# Patient Record
Sex: MALE | Race: WHITE | NOT HISPANIC OR LATINO
[De-identification: names, ages, dates, MRNs, and addresses within clinical notes are randomized per-mention and may not be internally consistent; named-entity substitution may affect disease eponyms.]

---

## 2017-10-24 PROBLEM — Z00.00 ENCOUNTER FOR PREVENTIVE HEALTH EXAMINATION: Status: ACTIVE | Noted: 2017-10-24

## 2017-10-27 ENCOUNTER — APPOINTMENT (OUTPATIENT)
Dept: UROLOGY | Facility: CLINIC | Age: 62
End: 2017-10-27
Payer: MEDICAID

## 2017-10-27 VITALS
HEIGHT: 73 IN | SYSTOLIC BLOOD PRESSURE: 114 MMHG | DIASTOLIC BLOOD PRESSURE: 76 MMHG | WEIGHT: 198 LBS | TEMPERATURE: 98.5 F | HEART RATE: 64 BPM | BODY MASS INDEX: 26.24 KG/M2

## 2017-10-27 DIAGNOSIS — R97.20 ELEVATED PROSTATE, SPECIFIC ANTIGEN [PSA]: ICD-10-CM

## 2017-10-27 DIAGNOSIS — Z78.9 OTHER SPECIFIED HEALTH STATUS: ICD-10-CM

## 2017-10-27 DIAGNOSIS — I10 ESSENTIAL (PRIMARY) HYPERTENSION: ICD-10-CM

## 2017-10-27 DIAGNOSIS — Z87.891 PERSONAL HISTORY OF NICOTINE DEPENDENCE: ICD-10-CM

## 2017-10-27 PROCEDURE — 99204 OFFICE O/P NEW MOD 45 MIN: CPT

## 2017-10-27 RX ORDER — AZITHROMYCIN 250 MG/1
250 TABLET, FILM COATED ORAL
Qty: 6 | Refills: 0 | Status: COMPLETED | COMMUNITY
Start: 2017-07-07

## 2017-10-27 RX ORDER — LISINOPRIL 2.5 MG/1
2.5 TABLET ORAL
Qty: 30 | Refills: 0 | Status: ACTIVE | COMMUNITY
Start: 2017-05-13

## 2017-10-30 LAB
APPEARANCE: CLEAR
BACTERIA UR CULT: NORMAL
BACTERIA: NEGATIVE
BILIRUBIN URINE: NEGATIVE
BLOOD URINE: NEGATIVE
COLOR: ABNORMAL
GLUCOSE QUALITATIVE U: NEGATIVE MG/DL
HYALINE CASTS: 0 /LPF
KETONES URINE: ABNORMAL
LEUKOCYTE ESTERASE URINE: NEGATIVE
MICROSCOPIC-UA: NORMAL
NITRITE URINE: NEGATIVE
PH URINE: 5
PROTEIN URINE: NEGATIVE MG/DL
RED BLOOD CELLS URINE: 2 /HPF
SPECIFIC GRAVITY URINE: 1.02
SQUAMOUS EPITHELIAL CELLS: 0 /HPF
UROBILINOGEN URINE: NEGATIVE MG/DL
WHITE BLOOD CELLS URINE: 0 /HPF

## 2017-11-06 ENCOUNTER — FORM ENCOUNTER (OUTPATIENT)
Age: 62
End: 2017-11-06

## 2017-11-07 ENCOUNTER — OUTPATIENT (OUTPATIENT)
Dept: OUTPATIENT SERVICES | Facility: HOSPITAL | Age: 62
LOS: 1 days | End: 2017-11-07
Payer: MEDICAID

## 2017-11-07 ENCOUNTER — APPOINTMENT (OUTPATIENT)
Dept: CT IMAGING | Facility: CLINIC | Age: 62
End: 2017-11-07
Payer: MEDICAID

## 2017-11-07 DIAGNOSIS — R31.29 OTHER MICROSCOPIC HEMATURIA: ICD-10-CM

## 2017-11-07 DIAGNOSIS — Z00.8 ENCOUNTER FOR OTHER GENERAL EXAMINATION: ICD-10-CM

## 2017-11-07 PROCEDURE — 82565 ASSAY OF CREATININE: CPT

## 2017-11-07 PROCEDURE — 74178 CT ABD&PLV WO CNTR FLWD CNTR: CPT | Mod: 26

## 2017-11-07 PROCEDURE — 74178 CT ABD&PLV WO CNTR FLWD CNTR: CPT

## 2017-11-20 ENCOUNTER — APPOINTMENT (OUTPATIENT)
Dept: UROLOGY | Facility: CLINIC | Age: 62
End: 2017-11-20
Payer: MEDICAID

## 2017-11-20 VITALS — TEMPERATURE: 97.4 F | SYSTOLIC BLOOD PRESSURE: 122 MMHG | DIASTOLIC BLOOD PRESSURE: 80 MMHG | HEART RATE: 73 BPM

## 2017-11-20 DIAGNOSIS — R31.29 OTHER MICROSCOPIC HEMATURIA: ICD-10-CM

## 2017-11-20 PROCEDURE — 52000 CYSTOURETHROSCOPY: CPT

## 2017-11-20 RX ORDER — ASCORBIC ACID 1000 MG
TABLET ORAL
Refills: 0 | Status: ACTIVE | COMMUNITY

## 2017-11-20 RX ORDER — MULTIVITAMIN
TABLET ORAL
Refills: 0 | Status: ACTIVE | COMMUNITY

## 2017-11-20 RX ORDER — PNV NO.95/FERROUS FUM/FOLIC AC 28MG-0.8MG
1000 TABLET ORAL
Refills: 0 | Status: ACTIVE | COMMUNITY

## 2017-11-20 RX ORDER — SENNOSIDES 8.6 MG
TABLET ORAL
Refills: 0 | Status: ACTIVE | COMMUNITY

## 2017-11-20 RX ORDER — MULTIVIT-MIN/FOLIC/VIT K/LYCOP 400-300MCG
250 TABLET ORAL
Refills: 0 | Status: ACTIVE | COMMUNITY

## 2017-11-20 RX ORDER — FOLIC ACID/MULTIVIT,IRON,MINER 0.4MG-18MG
TABLET ORAL
Refills: 0 | Status: ACTIVE | COMMUNITY

## 2017-11-27 LAB — CORE LAB FLUID CYTOLOGY: NORMAL

## 2018-03-16 ENCOUNTER — APPOINTMENT (OUTPATIENT)
Dept: UROLOGY | Facility: CLINIC | Age: 63
End: 2018-03-16

## 2019-09-12 ENCOUNTER — APPOINTMENT (OUTPATIENT)
Dept: ORTHOPEDIC SURGERY | Facility: CLINIC | Age: 64
End: 2019-09-12
Payer: MEDICAID

## 2019-09-12 VITALS
DIASTOLIC BLOOD PRESSURE: 89 MMHG | SYSTOLIC BLOOD PRESSURE: 138 MMHG | HEART RATE: 68 BPM | HEIGHT: 73 IN | BODY MASS INDEX: 26.51 KG/M2 | WEIGHT: 200 LBS

## 2019-09-12 PROCEDURE — 72100 X-RAY EXAM L-S SPINE 2/3 VWS: CPT

## 2019-09-12 PROCEDURE — 99204 OFFICE O/P NEW MOD 45 MIN: CPT | Mod: 25

## 2019-09-12 PROCEDURE — 96372 THER/PROPH/DIAG INJ SC/IM: CPT | Mod: 59

## 2019-09-12 NOTE — ADDENDUM
[FreeTextEntry1] : Documented by Holli Larry acting as a scribe for Dr. Foster Barrett. 09/12/2019\par \par All medical record entries made by the Scribe were at my, Dr. Foster Barrett, direction and personally dictated by me on 09/12/2019. I have reviewed the chart and agree that the record accurately reflects my personal performance of the history, physical exam, assessment and plan. I have also personally directed, reviewed, and agreed with the chart.

## 2019-09-12 NOTE — REVIEW OF SYSTEMS
[Joint Pain] : joint pain [Sleep Disturbances] : ~T sleep disturbances [Negative] : Heme/Lymph [Fever] : no fever [Chills] : no chills [Cough] : no cough [SOB on Exertion] : no shortness of breath on exertion

## 2019-09-12 NOTE — DISCUSSION/SUMMARY
[de-identified] : I have recommended that the pt continue with a conservative treatment plan. A therapeutic cortisone injection was administered in the office today. The patient tolerated the procedure well with no resistance. Pt should consider chiropractic care. Pt should also continue to physical therapy for decreased pain modalities, core strengthening modalities and physical modalities. He was advised to continue being active but should practice activity modification. A Medrol dose pack and Toradol has been prescribed for pain relief and the pt was advised to continue with use of anti-inflammatories prn. The pt has been referred to physiatry for selective nerve root block/possible injection therapy. He may follow up in 6 weeks and/or if his signs and symptoms persist.

## 2019-09-12 NOTE — PHYSICAL EXAM
[de-identified] : CONSTITUTIONAL: The patient is a very pleasant individual who is well-nourished and who appears stated age.\par PSYCHIATRIC: The patient is alert and oriented X 3 and in no apparent distress, and participates with orthopedic evaluation well.\par HEAD: Atraumatic and is nonsyndromic in appearance.\par EENT: No visible thyromegaly, EOMI.\par RESPIRATORY: Respiratory rate is regular, not dyspneic on examination.\par LYMPHATICS: There is no inguinal lymphadenopathy\par INTEGUMENTARY: Skin is clean, dry, and intact about the bilateral lower extremities and lumbar spine.\par VASCULAR: There is brisk capillary refill about the bilateral lower extremities.\par NEUROLOGIC: There are no pathologic reflexes. There is a subjectivedecrease in sensation of the right lower extremities on manual examination. Deep tendon reflexes are well maintained at 2+/4 of the bilateral lower extremities and are symmetric..\par MUSCULOSKELETAL: There is no visible muscular atrophy. Manual motor strength is well maintained in the bilateral lower extremities. Range of motion of lumbar spine is well maintained. The patient ambulates in a non-myelopathic manner. Negative tension sign and straight leg raise bilaterally. Quad extension, ankle dorsiflexion, EHL, plantar flexion, and ankle eversion are well preserved. Normal secondary orthopaedic exam of bilateral hips, greater trochanteric area, knees and ankles.  [de-identified] : Xray of the lumbar spine taken today shows spondylosis of L2 and L1 age appropriate lumbar DDD. No acute fx. \par \par MRI of the lumbar spine taken at [facility] on 09/06/19 shows a foraminal disc protrusion at L4-L5.

## 2019-10-10 ENCOUNTER — APPOINTMENT (OUTPATIENT)
Dept: ORTHOPEDIC SURGERY | Facility: CLINIC | Age: 64
End: 2019-10-10
Payer: MEDICAID

## 2019-10-10 VITALS
WEIGHT: 200 LBS | DIASTOLIC BLOOD PRESSURE: 88 MMHG | SYSTOLIC BLOOD PRESSURE: 134 MMHG | BODY MASS INDEX: 26.51 KG/M2 | HEIGHT: 73 IN | HEART RATE: 62 BPM

## 2019-10-10 DIAGNOSIS — I10 ESSENTIAL (PRIMARY) HYPERTENSION: ICD-10-CM

## 2019-10-10 DIAGNOSIS — Z80.0 FAMILY HISTORY OF MALIGNANT NEOPLASM OF DIGESTIVE ORGANS: ICD-10-CM

## 2019-10-10 PROCEDURE — 99214 OFFICE O/P EST MOD 30 MIN: CPT

## 2019-10-10 PROCEDURE — 72100 X-RAY EXAM L-S SPINE 2/3 VWS: CPT

## 2019-10-10 NOTE — ADDENDUM
[FreeTextEntry1] : Documented by Holli Larry acting as a scribe for Dr. Foster Barrett. 10/10/2019\par \par All medical record entries made by the Scribe were at my, Dr. Foster Barrett, direction and personally dictated by me on 10/10/2019. I have reviewed the chart and agree that the record accurately reflects my personal performance of the history, physical exam, assessment and plan. I have also personally directed, reviewed, and agreed with the chart.

## 2019-10-10 NOTE — PHYSICAL EXAM
[de-identified] : CONSTITUTIONAL: The patient is a very pleasant individual who is well-nourished and who appears stated age.\par PSYCHIATRIC: The patient is alert and oriented X 3 and in no apparent distress, and participates with orthopedic evaluation well.\par HEAD: Atraumatic and is nonsyndromic in appearance.\par EENT: No visible thyromegaly, EOMI.\par RESPIRATORY: Respiratory rate is regular, not dyspneic on examination.\par LYMPHATICS: There is no inguinal lymphadenopathy\par INTEGUMENTARY: Skin is clean, dry, and intact about the bilateral lower extremities and lumbar spine.\par VASCULAR: There is brisk capillary refill about the bilateral lower extremities.\par NEUROLOGIC: There are no pathologic reflexes. There is a subjectivedecrease in sensation of the right lower extremities on manual examination. Deep tendon reflexes are well maintained at 2+/4 of the bilateral lower extremities and are symmetric..\par MUSCULOSKELETAL: There is no visible muscular atrophy. Manual motor strength is well maintained in the bilateral lower extremities. Range of motion of lumbar spine is well maintained. The patient ambulates in a non-myelopathic manner. Negative tension sign and straight leg raise bilaterally. Quad extension, ankle dorsiflexion, EHL, plantar flexion, and ankle eversion are well preserved. Normal secondary orthopaedic exam of bilateral hips, greater trochanteric area, knees and ankles.  [de-identified] : Xray of the lumbar spine taken today shows well maintained lumbar lordosis. \par \par Previous MRI reviewed does not shows compression fx.

## 2019-10-10 NOTE — DISCUSSION/SUMMARY
[de-identified] : I have recommended that the pt continue with a conservative treatment plan. I recommend home exercises such as stretching, core strengthening, weight bearing exercises, and aerobic conditioning. We also spoke about the benefits of using heat, ice, and Bengay cream. Pt should continue with OTC anti-inflammatories. He may follow up on a PRN basis for repeat clinical evaluation.

## 2019-10-10 NOTE — HISTORY OF PRESENT ILLNESS
[Stable] : stable [___ mths] : [unfilled] month(s) ago [Intermit.] : ~He/She~ states the symptoms seem to be intermittent [Rest] : relieved by rest [de-identified] : 64 year old male presents for follow up of RLE radiculopathy and a suspected L2 compression fx since 07/06/19.His main complaint is numbness, tingling, and weakness in RLE. He also c/o associated sleep disturbances. Pt states the pain radiates down to the knee and ankle on inside of the RLE. The pain is reported as a 6/10 and is described as localized. Sleeping on his back and stomach exacerbates his symptoms. He is a very active individual, he participates in mountain biking and skiing. Pt states his pain is stable at this time. He recently played golf without much exacerbation of his pain. PMHx of HLD, HTN, and knee arthritis. Pt is retired.  [Ataxia] : no ataxia [Incontinence] : no incontinence [Loss of Dexterity] : good dexterity [Urinary Ret.] : no urinary retention

## 2020-09-04 ENCOUNTER — APPOINTMENT (OUTPATIENT)
Dept: ORTHOPEDIC SURGERY | Facility: CLINIC | Age: 65
End: 2020-09-04
Payer: MEDICAID

## 2020-09-04 VITALS
HEIGHT: 73 IN | BODY MASS INDEX: 26.51 KG/M2 | HEART RATE: 68 BPM | DIASTOLIC BLOOD PRESSURE: 90 MMHG | WEIGHT: 200 LBS | SYSTOLIC BLOOD PRESSURE: 128 MMHG

## 2020-09-04 VITALS — TEMPERATURE: 98.3 F

## 2020-09-04 DIAGNOSIS — Z80.0 FAMILY HISTORY OF MALIGNANT NEOPLASM OF DIGESTIVE ORGANS: ICD-10-CM

## 2020-09-04 PROCEDURE — 72100 X-RAY EXAM L-S SPINE 2/3 VWS: CPT

## 2020-09-04 PROCEDURE — 99214 OFFICE O/P EST MOD 30 MIN: CPT

## 2020-09-04 NOTE — HISTORY OF PRESENT ILLNESS
[de-identified] : Patient presents for repeat clinical evaluation with regard to lumbar degenerative disc disease and mechanically orientated low back pain. Patient has completed physical therapy has not continued home therapy. Patient has no significant complaints of neurologic deficits more mechanically orientated pain his KASANDRA is negative

## 2020-09-04 NOTE — PHYSICAL EXAM
[de-identified] : X-rays have been reviewed the lumbar spine shows lumbar degenerative disc disease [de-identified] : CONSTITUTIONAL: The patient is a very pleasant individual who is well-nourished and who appears stated age.\par PSYCHIATRIC: The patient is alert and oriented X 3 and in no apparent distress, and participates with orthopedic evaluation well.\par HEAD: Atraumatic and is nonsyndromic in appearance.\par EENT: No visible thyromegaly, EOMI.\par RESPIRATORY: Respiratory rate is regular, not dyspneic on examination.\par LYMPHATICS: There is no inguinal lymphadenopathy\par INTEGUMENTARY: Skin is clean, dry, and intact about the bilateral lower extremities and lumbar spine.\par VASCULAR: There is brisk capillary refill about the bilateral lower extremities.\par NEUROLOGIC: There are no pathologic reflexes. There is no decrease in sensation of the bilateral lower extremities on Wartenberg pinwheel/manual examination. Deep tendon reflexes are well maintained at 2+/4 of the bilateral lower extremities and are symmetric..\par MUSCULOSKELETAL: There is no visible muscular atrophy. Manual motor strength is well maintained in the bilateral lower extremities. Range of motion of lumbar spine is well maintained. The patient ambulates in a non-myelopathic manner. Negative tension sign and straight leg raise bilaterally. Quad extension, ankle dorsiflexion, EHL, plantar flexion, and ankle eversion are well preserved. Normal secondary orthopaedic exam of bilateral hips, greater trochanteric area, knees and ankles.\par Physical exam on today's date and consisted primarily of mechanically orientated low back pain\par

## 2020-09-18 ENCOUNTER — APPOINTMENT (OUTPATIENT)
Dept: PHYSICAL MEDICINE AND REHAB | Facility: CLINIC | Age: 65
End: 2020-09-18
Payer: MEDICAID

## 2020-09-18 VITALS
BODY MASS INDEX: 27.17 KG/M2 | HEART RATE: 60 BPM | DIASTOLIC BLOOD PRESSURE: 76 MMHG | SYSTOLIC BLOOD PRESSURE: 119 MMHG | WEIGHT: 205 LBS | HEIGHT: 73 IN

## 2020-09-18 PROCEDURE — 99202 OFFICE O/P NEW SF 15 MIN: CPT

## 2020-09-18 RX ORDER — METHYLPREDNISOLONE 4 MG/1
4 TABLET ORAL
Qty: 1 | Refills: 0 | Status: DISCONTINUED | COMMUNITY
Start: 2019-09-12 | End: 2020-09-18

## 2020-09-18 RX ORDER — METHYLPREDNISOLONE 4 MG/1
4 TABLET ORAL
Qty: 1 | Refills: 1 | Status: DISCONTINUED | COMMUNITY
Start: 2020-09-04 | End: 2020-09-18

## 2020-09-18 RX ORDER — NAPROXEN 500 MG/1
TABLET ORAL
Refills: 0 | Status: ACTIVE | COMMUNITY

## 2020-09-18 RX ORDER — KETOROLAC TROMETHAMINE 10 MG/1
10 TABLET, FILM COATED ORAL 3 TIMES DAILY
Qty: 15 | Refills: 0 | Status: COMPLETED | COMMUNITY
Start: 2019-09-12 | End: 2020-09-18

## 2020-09-18 RX ORDER — ROSUVASTATIN CALCIUM 5 MG/1
5 TABLET, FILM COATED ORAL
Qty: 90 | Refills: 0 | Status: COMPLETED | COMMUNITY
Start: 2017-10-17 | End: 2020-09-18

## 2020-09-18 RX ORDER — ATORVASTATIN CALCIUM 80 MG/1
TABLET, FILM COATED ORAL
Refills: 0 | Status: ACTIVE | COMMUNITY

## 2020-09-18 RX ORDER — METHYLPREDNISOLONE 4 MG/1
4 TABLET ORAL
Qty: 1 | Refills: 0 | Status: DISCONTINUED | COMMUNITY
Start: 2020-09-11 | End: 2020-09-18
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
## 2020-09-18 NOTE — ASSESSMENT
[FreeTextEntry1] : 65 y.o. M w/ R L4-5 foraminal HNP -> R L4 radiculopathy w/o motor deficit.  Pt. previously had 4 months of P.T. w/o relief of sxs.  Advised pt. to D/C medrol dose pack.  Discussed R/B/A to R L4-5 TFESI under fluoroscopy which he has agreed to.  Pt. will be tentatively booked w/i next 2 weeks.

## 2020-09-18 NOTE — DATA REVIEWED
[MRI] : MRI [FreeTextEntry1] : MRI L-spine (Sept 2019): c/w multilevel DDD; RIGHT L4-5 FORAMINAL HNP -> CONTACTING EXITING L4 NR.

## 2020-09-18 NOTE — PHYSICAL EXAM
[FreeTextEntry1] : NAD\par A&Ox3\par Non-obese\par ROM L-spine: full forward flexion (able to palm floor) w/o pain; 20' passive extension w/o pain\par ROM Hips: smooth IR/ER w/o pain\par Pelvic tilt: none\par Seated slump test: neg\par SLR: neg\par ROSS's: neg\par DTR's: depressed knees/ankles\par MMT: 5/5 b/l LE\par Sensation: SILT.  No decrement to PP testing R L4-5 dermatomes\par Toe & Heel Walk: Yes\par \par

## 2020-10-07 ENCOUNTER — APPOINTMENT (OUTPATIENT)
Dept: DISASTER EMERGENCY | Facility: CLINIC | Age: 65
End: 2020-10-07

## 2020-10-07 DIAGNOSIS — Z01.818 ENCOUNTER FOR OTHER PREPROCEDURAL EXAMINATION: ICD-10-CM

## 2020-10-08 LAB — SARS-COV-2 N GENE NPH QL NAA+PROBE: NOT DETECTED

## 2020-10-11 ENCOUNTER — TRANSCRIPTION ENCOUNTER (OUTPATIENT)
Age: 65
End: 2020-10-11

## 2020-10-12 ENCOUNTER — APPOINTMENT (OUTPATIENT)
Dept: PHYSICAL MEDICINE AND REHAB | Facility: GI CENTER | Age: 65
End: 2020-10-12
Payer: MEDICAID

## 2020-10-12 ENCOUNTER — OUTPATIENT (OUTPATIENT)
Dept: OUTPATIENT SERVICES | Facility: HOSPITAL | Age: 65
LOS: 1 days | End: 2020-10-12
Payer: COMMERCIAL

## 2020-10-12 DIAGNOSIS — M54.16 RADICULOPATHY, LUMBAR REGION: ICD-10-CM

## 2020-10-12 PROCEDURE — 64483 NJX AA&/STRD TFRM EPI L/S 1: CPT | Mod: RT

## 2020-10-12 PROCEDURE — 76000 FLUOROSCOPY <1 HR PHYS/QHP: CPT

## 2020-10-19 ENCOUNTER — APPOINTMENT (OUTPATIENT)
Dept: NEUROSURGERY | Facility: CLINIC | Age: 65
End: 2020-10-19
Payer: MEDICAID

## 2020-10-19 VITALS
TEMPERATURE: 97.3 F | WEIGHT: 201.13 LBS | HEIGHT: 73 IN | HEART RATE: 68 BPM | OXYGEN SATURATION: 97 % | SYSTOLIC BLOOD PRESSURE: 126 MMHG | BODY MASS INDEX: 26.66 KG/M2 | DIASTOLIC BLOOD PRESSURE: 84 MMHG

## 2020-10-19 PROCEDURE — 99202 OFFICE O/P NEW SF 15 MIN: CPT

## 2020-10-19 PROCEDURE — 99072 ADDL SUPL MATRL&STAF TM PHE: CPT

## 2020-10-19 RX ORDER — ATORVASTATIN CALCIUM 10 MG/1
10 TABLET, FILM COATED ORAL
Qty: 30 | Refills: 0 | Status: ACTIVE | COMMUNITY
Start: 2019-10-23
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
## 2020-10-27 ENCOUNTER — APPOINTMENT (OUTPATIENT)
Dept: PHYSICAL MEDICINE AND REHAB | Facility: CLINIC | Age: 65
End: 2020-10-27

## 2020-11-02 ENCOUNTER — NON-APPOINTMENT (OUTPATIENT)
Age: 65
End: 2020-11-02

## 2020-11-30 ENCOUNTER — APPOINTMENT (OUTPATIENT)
Dept: NEUROSURGERY | Facility: CLINIC | Age: 65
End: 2020-11-30
Payer: MEDICAID

## 2020-11-30 VITALS
HEIGHT: 73 IN | OXYGEN SATURATION: 97 % | TEMPERATURE: 97.4 F | SYSTOLIC BLOOD PRESSURE: 135 MMHG | WEIGHT: 201.25 LBS | DIASTOLIC BLOOD PRESSURE: 87 MMHG | BODY MASS INDEX: 26.67 KG/M2 | HEART RATE: 84 BPM

## 2020-11-30 DIAGNOSIS — M47.816 SPONDYLOSIS W/OUT MYELOPATHY OR RADICULOPATHY, LUMBAR REGION: ICD-10-CM

## 2020-11-30 DIAGNOSIS — M53.86 OTHER SPECIFIED DORSOPATHIES, LUMBAR REGION: ICD-10-CM

## 2020-11-30 PROCEDURE — 99072 ADDL SUPL MATRL&STAF TM PHE: CPT

## 2020-11-30 PROCEDURE — 99213 OFFICE O/P EST LOW 20 MIN: CPT

## 2020-12-06 PROBLEM — M53.86 SCIATICA OF RIGHT SIDE ASSOCIATED WITH DISORDER OF LUMBAR SPINE: Status: ACTIVE | Noted: 2019-09-12
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
## 2020-12-08 ENCOUNTER — APPOINTMENT (OUTPATIENT)
Dept: PHYSICAL MEDICINE AND REHAB | Facility: CLINIC | Age: 65
End: 2020-12-08
Payer: MEDICAID

## 2020-12-08 VITALS
RESPIRATION RATE: 14 BRPM | HEART RATE: 75 BPM | OXYGEN SATURATION: 98 % | WEIGHT: 202 LBS | SYSTOLIC BLOOD PRESSURE: 162 MMHG | TEMPERATURE: 97.4 F | HEIGHT: 73 IN | BODY MASS INDEX: 26.77 KG/M2 | DIASTOLIC BLOOD PRESSURE: 103 MMHG

## 2020-12-08 DIAGNOSIS — M54.16 RADICULOPATHY, LUMBAR REGION: ICD-10-CM

## 2020-12-08 PROCEDURE — 99072 ADDL SUPL MATRL&STAF TM PHE: CPT

## 2020-12-08 PROCEDURE — 73502 X-RAY EXAM HIP UNI 2-3 VIEWS: CPT | Mod: RT

## 2020-12-08 PROCEDURE — 72170 X-RAY EXAM OF PELVIS: CPT | Mod: 59

## 2020-12-08 PROCEDURE — 99214 OFFICE O/P EST MOD 30 MIN: CPT | Mod: 25

## 2020-12-08 NOTE — PHYSICAL EXAM
[FreeTextEntry1] : NAD\par A&Ox3\par Non-obese\par ROM L-spine: full forward flexion (able to palm floor) w/o pain; 20' passive extension w/o pain\par ROM Hips: right hip restricted IR/ER w/ lateral hip pain\par Pelvic tilt: ++ right\par Seated slump test: neg\par SLR: neg\par ROSS's: +/-  right\par DTR's: depressed B/L knees/ankles\par MMT: 5/5 b/l LE\par Sensation: SILT.  No decrement to PP testing R L4-5 dermatomes\par Toe & Heel Walk: Yes\par \par

## 2020-12-08 NOTE — DATA REVIEWED
[MRI] : MRI [FreeTextEntry1] : X-rays AP pelvis + 2 views Right hip taken at today's office visit: c/w moderate-to-severe right hip OA w/ abnormal femoral head neck offset -> ARIE.\par \par MRI L-spine (Sept 2019): c/w multilevel DDD; RIGHT L4-5 FORAMINAL HNP -> CONTACTING EXITING L4 NR.
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
## 2020-12-08 NOTE — ASSESSMENT
[FreeTextEntry1] : 65 y.o. M w/ clinical hip spine syndrome characterized by R L4-5 foraminal HNP -> R L4 radiculopathy w/o motor deficit + moderate-to-severe right hip OA w/ abnormal femoral head neck offset -> ARIE.  Discussed R/B/A to repeat R L4-5 TFESI w/ particulate steroid under fluoroscopy (no propofol) vs. US guided LA block right hip to help determine patient's dominant pain generator.  Will review patient's most recent MRI L-spine and decide next steps.\par \par

## 2020-12-18 ENCOUNTER — APPOINTMENT (OUTPATIENT)
Dept: PHYSICAL MEDICINE AND REHAB | Facility: CLINIC | Age: 65
End: 2020-12-18
Payer: MEDICAID

## 2020-12-18 VITALS
HEART RATE: 96 BPM | SYSTOLIC BLOOD PRESSURE: 143 MMHG | TEMPERATURE: 97.2 F | HEIGHT: 73 IN | RESPIRATION RATE: 14 BRPM | OXYGEN SATURATION: 99 % | BODY MASS INDEX: 26.9 KG/M2 | WEIGHT: 203 LBS | DIASTOLIC BLOOD PRESSURE: 94 MMHG

## 2020-12-18 PROCEDURE — 99072 ADDL SUPL MATRL&STAF TM PHE: CPT

## 2020-12-18 PROCEDURE — 20611 DRAIN/INJ JOINT/BURSA W/US: CPT | Mod: RT

## 2020-12-29 ENCOUNTER — APPOINTMENT (OUTPATIENT)
Dept: PHYSICAL MEDICINE AND REHAB | Facility: CLINIC | Age: 65
End: 2020-12-29
Payer: MEDICAID

## 2020-12-29 VITALS
OXYGEN SATURATION: 99 % | HEIGHT: 73 IN | DIASTOLIC BLOOD PRESSURE: 92 MMHG | RESPIRATION RATE: 14 BRPM | HEART RATE: 75 BPM | TEMPERATURE: 95 F | SYSTOLIC BLOOD PRESSURE: 146 MMHG | WEIGHT: 200 LBS | BODY MASS INDEX: 26.51 KG/M2

## 2020-12-29 VITALS — TEMPERATURE: 94.7 F

## 2020-12-29 PROCEDURE — 99072 ADDL SUPL MATRL&STAF TM PHE: CPT

## 2020-12-29 PROCEDURE — 99212 OFFICE O/P EST SF 10 MIN: CPT | Mod: 25

## 2020-12-29 PROCEDURE — 20611 DRAIN/INJ JOINT/BURSA W/US: CPT | Mod: RT

## 2021-01-12 ENCOUNTER — APPOINTMENT (OUTPATIENT)
Dept: ORTHOPEDIC SURGERY | Facility: CLINIC | Age: 66
End: 2021-01-12
Payer: MEDICARE

## 2021-01-12 VITALS
WEIGHT: 200 LBS | SYSTOLIC BLOOD PRESSURE: 129 MMHG | BODY MASS INDEX: 26.51 KG/M2 | HEIGHT: 73 IN | DIASTOLIC BLOOD PRESSURE: 87 MMHG | HEART RATE: 76 BPM

## 2021-01-12 PROCEDURE — 73502 X-RAY EXAM HIP UNI 2-3 VIEWS: CPT | Mod: RT

## 2021-01-12 PROCEDURE — 99072 ADDL SUPL MATRL&STAF TM PHE: CPT

## 2021-01-12 PROCEDURE — 99215 OFFICE O/P EST HI 40 MIN: CPT
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
## 2021-01-12 NOTE — DISCUSSION/SUMMARY
[de-identified] : The patient is a 66 year old male with bone on bone arthritis of the right hip. Based upon the patients continued symptoms and failure to respond to conservative treatment I have recommended a right total hip replacement for the patient. A discussion was had with the patient regarding a right total hip replacement. Anterior and posterior exposures were discussed. The patient would like to proceed with an anterior approach. A long discussion was had with the patient as what the total joint replacement would entail. A model was used to demonstrate the operation and to discuss bearing surfaces of the implants. The hospitalization and rehabilitation were discussed. The use of perioperative antibiotics and DVT prophylaxis were discussed. The risks, benefits and alternatives to surgical intervention were discussed at length with the patient. Specific risks discussed included: infection, wound breakdown, numbness and damage to nerves, tendon, muscle, arteries or other blood vessels, and the possibility of leg length discrepancy. The possibility of recurrent pain, no improvement in pain and actual worsening of the pain were also mentioned in conversation with the patient. Medical complications related to the patient's general medical health including deep vein thrombosis, pulmonary embolus, heart attack, stroke, death and other complications from anesthesia were discussed as well. The patient was told that we will take steps to minimize these risks by using sterile technique, antibiotics and DVT prophylaxis when appropriate and following the patient postoperatively in the clinic setting to monitor progress. The benefits of surgery were discussed with the patient including the potential to improve the current clinical condition through operative intervention. Alternatives to surgical intervention include continued conservative management which may yield less than optimal results in this particular patient. All questions were answered to the satisfaction of the patient. Models were used as an educational tool. We did discuss implant choices and fixation, with shared decision making with the patient.\par  \par We are planning for robotic assistance for the total hip replacement. We discussed that this will require placement of iliac crest pins in the contralateral iliac crest for pelvic bone registration to allow for robotic guidance for the surgery. We will utilize robotic assistance to improve accuracy of the implants, and accurate restoration of both leg lengths and femoral offset.

## 2021-01-12 NOTE — CONSULT LETTER
[Dear  ___] : Dear  [unfilled], [Consult Letter:] : I had the pleasure of evaluating your patient, [unfilled]. [Please see my note below.] : Please see my note below. [Consult Closing:] : Thank you very much for allowing me to participate in the care of this patient.  If you have any questions, please do not hesitate to contact me. [Sincerely,] : Sincerely, [FreeTextEntry2] : ALEXIA JAIME MD\par  [FreeTextEntry3] : Shahab Candelaria MD\par Chief of Joint Replacement\par Primary [facility] Hip [facility] Knee Replacement \par [facility] [place]\par \par

## 2021-01-12 NOTE — ADDENDUM
[FreeTextEntry1] : This note was authored by Eric Gonzalez working as a medical scribe for Dr. Shahab Candelaria. The note was reviewed, edited, and revised by Dr. Shahab Candelaria whom is in agreement with the exam findings, imaging findings, and treatment plan. 01/12/2021.

## 2021-01-12 NOTE — PHYSICAL EXAM
[de-identified] : The patient appears well nourished  and in no apparent distress.  The patient is alert and oriented to person, place, and time.   Affect and mood appear normal. The head is normocephalic and atraumatic.  The eyes reveal normal sclera and extra ocular muscles are intact. The tongue is midline with no apparent lesions.  Skin shows normal turgor with no evidence of eczema or psoriasis.  No respiratory distress noted.  Sensation grossly intact.\par   [de-identified] : Exam of the right hip shows hip flexion of 70 degrees, hip external rotation of 45 degrees, internal rotation is neutral with pain throughout. 5/5 motor strength bilaterally distally. Sensation intact distally. The right leg is short by 1 cm compared to the left leg. [de-identified] : Xray- AP pelvis and 2 views right hip shows bone on bone arthritis of the right hip.

## 2021-01-27 ENCOUNTER — APPOINTMENT (OUTPATIENT)
Dept: NEUROSURGERY | Facility: CLINIC | Age: 66
End: 2021-01-27
Payer: MEDICARE

## 2021-01-27 VITALS
SYSTOLIC BLOOD PRESSURE: 130 MMHG | DIASTOLIC BLOOD PRESSURE: 86 MMHG | WEIGHT: 205 LBS | OXYGEN SATURATION: 97 % | HEART RATE: 71 BPM | TEMPERATURE: 95.3 F | HEIGHT: 73 IN | BODY MASS INDEX: 27.17 KG/M2

## 2021-01-27 DIAGNOSIS — M54.5 LOW BACK PAIN: ICD-10-CM

## 2021-01-27 DIAGNOSIS — S32.000A WEDGE COMPRESSION FRACTURE OF UNSPECIFIED LUMBAR VERTEBRA, INITIAL ENCOUNTER FOR CLOSED FRACTURE: ICD-10-CM

## 2021-01-27 DIAGNOSIS — M25.551 PAIN IN RIGHT HIP: ICD-10-CM

## 2021-01-27 PROCEDURE — 99072 ADDL SUPL MATRL&STAF TM PHE: CPT

## 2021-01-27 PROCEDURE — 99213 OFFICE O/P EST LOW 20 MIN: CPT

## 2021-02-22 ENCOUNTER — OUTPATIENT (OUTPATIENT)
Dept: OUTPATIENT SERVICES | Facility: HOSPITAL | Age: 66
LOS: 1 days | End: 2021-02-22
Payer: COMMERCIAL

## 2021-02-22 VITALS
HEIGHT: 67 IN | DIASTOLIC BLOOD PRESSURE: 91 MMHG | TEMPERATURE: 98 F | WEIGHT: 213.41 LBS | SYSTOLIC BLOOD PRESSURE: 139 MMHG

## 2021-02-22 DIAGNOSIS — Z98.890 OTHER SPECIFIED POSTPROCEDURAL STATES: Chronic | ICD-10-CM

## 2021-02-22 DIAGNOSIS — Z13.89 ENCOUNTER FOR SCREENING FOR OTHER DISORDER: ICD-10-CM

## 2021-02-22 DIAGNOSIS — E78.5 HYPERLIPIDEMIA, UNSPECIFIED: ICD-10-CM

## 2021-02-22 DIAGNOSIS — I10 ESSENTIAL (PRIMARY) HYPERTENSION: ICD-10-CM

## 2021-02-22 DIAGNOSIS — Z01.818 ENCOUNTER FOR OTHER PREPROCEDURAL EXAMINATION: ICD-10-CM

## 2021-02-22 DIAGNOSIS — Z29.9 ENCOUNTER FOR PROPHYLACTIC MEASURES, UNSPECIFIED: ICD-10-CM

## 2021-02-22 DIAGNOSIS — M16.11 UNILATERAL PRIMARY OSTEOARTHRITIS, RIGHT HIP: ICD-10-CM

## 2021-02-22 LAB
A1C WITH ESTIMATED AVERAGE GLUCOSE RESULT: 5.3 % — SIGNIFICANT CHANGE UP (ref 4–5.6)
ALBUMIN SERPL ELPH-MCNC: 4.1 G/DL — SIGNIFICANT CHANGE UP (ref 3.3–5.2)
ALP SERPL-CCNC: 138 U/L — HIGH (ref 40–120)
ALT FLD-CCNC: 104 U/L — HIGH
ANION GAP SERPL CALC-SCNC: 11 MMOL/L — SIGNIFICANT CHANGE UP (ref 5–17)
APTT BLD: 27.5 SEC — SIGNIFICANT CHANGE UP (ref 27.5–35.5)
AST SERPL-CCNC: 56 U/L — HIGH
BASOPHILS # BLD AUTO: 0.03 K/UL — SIGNIFICANT CHANGE UP (ref 0–0.2)
BASOPHILS NFR BLD AUTO: 0.6 % — SIGNIFICANT CHANGE UP (ref 0–2)
BILIRUB SERPL-MCNC: 0.4 MG/DL — SIGNIFICANT CHANGE UP (ref 0.4–2)
BLD GP AB SCN SERPL QL: SIGNIFICANT CHANGE UP
BUN SERPL-MCNC: 26 MG/DL — HIGH (ref 8–20)
CALCIUM SERPL-MCNC: 9.5 MG/DL — SIGNIFICANT CHANGE UP (ref 8.6–10.2)
CHLORIDE SERPL-SCNC: 106 MMOL/L — SIGNIFICANT CHANGE UP (ref 98–107)
CO2 SERPL-SCNC: 23 MMOL/L — SIGNIFICANT CHANGE UP (ref 22–29)
CREAT SERPL-MCNC: 1.11 MG/DL — SIGNIFICANT CHANGE UP (ref 0.5–1.3)
EOSINOPHIL # BLD AUTO: 0.08 K/UL — SIGNIFICANT CHANGE UP (ref 0–0.5)
EOSINOPHIL NFR BLD AUTO: 1.6 % — SIGNIFICANT CHANGE UP (ref 0–6)
ESTIMATED AVERAGE GLUCOSE: 105 MG/DL — SIGNIFICANT CHANGE UP (ref 68–114)
GLUCOSE SERPL-MCNC: 98 MG/DL — SIGNIFICANT CHANGE UP (ref 70–99)
HCT VFR BLD CALC: 41.3 % — SIGNIFICANT CHANGE UP (ref 39–50)
HGB BLD-MCNC: 14.1 G/DL — SIGNIFICANT CHANGE UP (ref 13–17)
IMM GRANULOCYTES NFR BLD AUTO: 0.2 % — SIGNIFICANT CHANGE UP (ref 0–1.5)
INR BLD: 0.93 RATIO — SIGNIFICANT CHANGE UP (ref 0.88–1.16)
LYMPHOCYTES # BLD AUTO: 0.8 K/UL — LOW (ref 1–3.3)
LYMPHOCYTES # BLD AUTO: 15.5 % — SIGNIFICANT CHANGE UP (ref 13–44)
MCHC RBC-ENTMCNC: 32 PG — SIGNIFICANT CHANGE UP (ref 27–34)
MCHC RBC-ENTMCNC: 34.1 GM/DL — SIGNIFICANT CHANGE UP (ref 32–36)
MCV RBC AUTO: 93.7 FL — SIGNIFICANT CHANGE UP (ref 80–100)
MONOCYTES # BLD AUTO: 0.43 K/UL — SIGNIFICANT CHANGE UP (ref 0–0.9)
MONOCYTES NFR BLD AUTO: 8.3 % — SIGNIFICANT CHANGE UP (ref 2–14)
MRSA PCR RESULT.: SIGNIFICANT CHANGE UP
NEUTROPHILS # BLD AUTO: 3.81 K/UL — SIGNIFICANT CHANGE UP (ref 1.8–7.4)
NEUTROPHILS NFR BLD AUTO: 73.8 % — SIGNIFICANT CHANGE UP (ref 43–77)
PLATELET # BLD AUTO: 229 K/UL — SIGNIFICANT CHANGE UP (ref 150–400)
POTASSIUM SERPL-MCNC: 4.8 MMOL/L — SIGNIFICANT CHANGE UP (ref 3.5–5.3)
POTASSIUM SERPL-SCNC: 4.8 MMOL/L — SIGNIFICANT CHANGE UP (ref 3.5–5.3)
PROT SERPL-MCNC: 7 G/DL — SIGNIFICANT CHANGE UP (ref 6.6–8.7)
PROTHROM AB SERPL-ACNC: 10.8 SEC — SIGNIFICANT CHANGE UP (ref 10.6–13.6)
RBC # BLD: 4.41 M/UL — SIGNIFICANT CHANGE UP (ref 4.2–5.8)
RBC # FLD: 13.2 % — SIGNIFICANT CHANGE UP (ref 10.3–14.5)
S AUREUS DNA NOSE QL NAA+PROBE: SIGNIFICANT CHANGE UP
SODIUM SERPL-SCNC: 140 MMOL/L — SIGNIFICANT CHANGE UP (ref 135–145)
WBC # BLD: 5.16 K/UL — SIGNIFICANT CHANGE UP (ref 3.8–10.5)
WBC # FLD AUTO: 5.16 K/UL — SIGNIFICANT CHANGE UP (ref 3.8–10.5)

## 2021-02-22 PROCEDURE — 93005 ELECTROCARDIOGRAM TRACING: CPT

## 2021-02-22 PROCEDURE — 93010 ELECTROCARDIOGRAM REPORT: CPT

## 2021-02-22 PROCEDURE — G0463: CPT

## 2021-02-22 NOTE — H&P PST ADULT - NSICDXPROBLEM_GEN_ALL_CORE_FT
PROBLEM DIAGNOSES  Problem: Unilateral primary osteoarthritis, right hip  Assessment and Plan: Scheduled for right total hip replacement, direct anterior approach with KATHY robot 3/12/2021  Medical clearance     Problem: Need for prophylactic measure  Assessment and Plan: Caprini 5- Moderate risk  Primary team please assess need for DVT prophylaxis    Problem: Screening for substance abuse  Assessment and Plan: ORT 0    Problem: Hyperlipidemia  Assessment and Plan: Follows with PCP    Problem: Hypertension  Assessment and Plan: Follows with PCP

## 2021-02-22 NOTE — H&P PST ADULT - ASSESSMENT
67 y/o male scheduled for right total hip replacement, direct anterior approach with KATHY robot on 3/12/2021.    Pending medical clearance    OPIOID RISK TOOL    ZEINAB EACH BOX THAT APPLIES AND ADD TOTALS AT THE END    FAMILY HISTORY OF SUBSTANCE ABUSE                 FEMALE         MALE                                                Alcohol                             [  ]1 pt          [  ]3pts                                               Illegal Durgs                     [  ]2 pts        [  ]3pts                                               Rx Drugs                           [  ]4 pts        [  ]4 pts    PERSONAL HISTORY OF SUBSTANCE ABUSE                                                                                          Alcohol                             [  ]3 pts       [  ]3 pts                                               Illegal Drugs                     [  ]4 pts        [  ]4 pts                                               Rx Drugs                           [  ]5 pts        [  ]5 pts    AGE BETWEEN 16-45 YEARS                                      [  ]1 pt         [  ]1 pt    HISTORY OF PREADOLESCENT   SEXUAL ABUSE                                                             [  ]3 pts        [  ]0pts    PSYCHOLOGICAL DISEASE                     ADD, OCD, Bipolar, Schizophrenia        [  ]2 pts         [  ]2 pts                      Depression                                               [  ]1 pt           [  ]1 pt           SCORING TOTAL   (add numbers and type here)              (0)                                     A score of 3 or lower indicated LOW risk for future opioid abuse  A score of 4 to 7 indicated moderate risk for future opioid abuse  A score of 8 or higher indicates a high risk for opioid abuse    CAPRINI SCORE [CLOT]    AGE RELATED RISK FACTORS                                                       MOBILITY RELATED FACTORS  [ ] Age 41-60 years                                            (1 Point)                  [ ] Bed rest                                                        (1 Point)  [ x] Age: 61-74 years                                           (2 Points)                 [ ] Plaster cast                                                   (2 Points)  [ ] Age= 75 years                                              (3 Points)                 [ ] Bed bound for more than 72 hours                 (2 Points)    DISEASE RELATED RISK FACTORS                                               GENDER SPECIFIC FACTORS  [ ] Edema in the lower extremities                       (1 Point)                  [ ] Pregnancy                                                     (1 Point)  [ ] Varicose veins                                               (1 Point)                  [ ] Post-partum < 6 weeks                                   (1 Point)             x] BMI > 25 Kg/m2                                            (1 Point)                  [ ] Hormonal therapy  or oral contraception          (1 Point)                 [ ] Sepsis (in the previous month)                        (1 Point)                  [ ] History of pregnancy complications                 (1 point)  [ ] Pneumonia or serious lung disease                                               [ ] Unexplained or recurrent                     (1 Point)           (in the previous month)                               (1 Point)  [ ] Abnormal pulmonary function test                     (1 Point)                 SURGERY RELATED RISK FACTORS  [ ] Acute myocardial infarction                              (1 Point)                 [ ]  Section                                             (1 Point)  [ ] Congestive heart failure (in the previous month)  (1 Point)               [ ] Minor surgery                                                  (1 Point)   [ ] Inflammatory bowel disease                             (1 Point)                 [ ] Arthroscopic surgery                                        (2 Points)  [ ] Central venous access                                      (2 Points)                [x ] General surgery lasting more than 45 minutes   (2 Points)       [ ] Stroke (in the previous month)                          (5 Points)               [ ] Elective arthroplasty                                         (5 Points)                                                                                                                                               HEMATOLOGY RELATED FACTORS                                                 TRAUMA RELATED RISK FACTORS  [ ] Prior episodes of VTE                                     (3 Points)                [ ] Fracture of the hip, pelvis, or leg                       (5 Points)  [ ] Positive family history for VTE                         (3 Points)                 [ ] Acute spinal cord injury (in the previous month)  (5 Points)  [ ] Prothrombin 36895 A                                     (3 Points)                 [ ] Paralysis  (less than 1 month)                             (5 Points)  [ ] Factor V Leiden                                             (3 Points)                  [ ] Multiple Trauma within 1 month                        (5 Points)  [ ] Lupus anticoagulants                                     (3 Points)                                                           [ ] Anticardiolipin antibodies                               (3 Points)                                                       [ ] High homocysteine in the blood                      (3 Points)                                             [ ] Other congenital or acquired thrombophilia      (3 Points)                                                [ ] Heparin induced thrombocytopenia                  (3 Points)                                          Total Score [      5    ]    Caprini Score 0 - 2:  Low Risk, No VTE Prophylaxis required for most patients, encourage ambulation  Caprini Score 3 - 6:  At Risk, pharmacologic VTE prophylaxis is indicated for most patients (in the absence of a contraindication)  Caprini Score Greater than or = 7:  High Risk, pharmacologic VTE prophylaxis is indicated for most patients (in the absence of a contraindication)

## 2021-02-22 NOTE — H&P PST ADULT - HISTORY OF PRESENT ILLNESS
Patient with osteoarthritis since 2020 about 3 months ago. Patient hx of right hip pain for about 9 months causing inability to ambulate. Patient currently reports using cane for ambulation. Tylenol and motrin for oain with some relief.  Ambulation make pain worse. Laying down on left side alleviate pain. Denies any chest pain, SOB, fever and chills. Scheduled for  65 y/o male presents to [facility] today, Patient reports a history of osteoarthritis since October of 2020. Patient reports a hx of right hip pain for about 9 months causing inability to ambulate. Patient currently reports using cane for ambulation. Reports taking tylenol and motrin for pain with some relief.  Ambulation make pain worse. Laying down on left side alleviate pain. Denies any chest pain, SOB, fever and chills. Scheduled for right total hip replacement, direct anterior approach with KATHY robot on 3/12/2021.

## 2021-02-22 NOTE — PATIENT PROFILE ADULT - NSPROHMSYMPCOND_GEN_A_NUR
Pre op teaching surgical scrub pain management instructions given to pt    Covid swab to be done March 9/none

## 2021-02-22 NOTE — PATIENT PROFILE ADULT - NSPREOP1_ABLETOREACHPT_GEN_A_NUR
[phone]    Denies domestic or international travel in the past 3 weeks [phone]    Denies domestic or international travel in the past 3 weeks/yes

## 2021-02-22 NOTE — H&P PST ADULT - NSICDXFAMILYHX_GEN_ALL_CORE_FT
FAMILY HISTORY:  Father  Still living? No  FH: brain aneurysm, Age at diagnosis: Age Unknown    Mother  Still living? No  Family history of TIAs, Age at diagnosis: Age Unknown  FH: colon cancer, Age at diagnosis: Age Unknown    Sibling  Still living? Yes, Estimated age: 51-60  FH: breast cancer, Age at diagnosis: Age Unknown  FH: heart disease, Age at diagnosis: Age Unknown  FH: pancreatic cancer, Age at diagnosis: Age Unknown

## 2021-02-23 ENCOUNTER — APPOINTMENT (OUTPATIENT)
Dept: ORTHOPEDIC SURGERY | Facility: CLINIC | Age: 66
End: 2021-02-23

## 2021-02-23 ENCOUNTER — APPOINTMENT (OUTPATIENT)
Dept: CT IMAGING | Facility: CLINIC | Age: 66
End: 2021-02-23
Payer: MEDICARE

## 2021-02-23 ENCOUNTER — OUTPATIENT (OUTPATIENT)
Dept: OUTPATIENT SERVICES | Facility: HOSPITAL | Age: 66
LOS: 1 days | End: 2021-02-23
Payer: MEDICARE

## 2021-02-23 DIAGNOSIS — Z98.890 OTHER SPECIFIED POSTPROCEDURAL STATES: Chronic | ICD-10-CM

## 2021-02-23 DIAGNOSIS — M16.11 UNILATERAL PRIMARY OSTEOARTHRITIS, RIGHT HIP: ICD-10-CM

## 2021-02-23 PROBLEM — E78.5 HYPERLIPIDEMIA, UNSPECIFIED: Chronic | Status: ACTIVE | Noted: 2021-02-22

## 2021-02-23 PROBLEM — I10 ESSENTIAL (PRIMARY) HYPERTENSION: Chronic | Status: ACTIVE | Noted: 2021-02-22

## 2021-02-23 PROCEDURE — 73700 CT LOWER EXTREMITY W/O DYE: CPT

## 2021-02-23 PROCEDURE — 73700 CT LOWER EXTREMITY W/O DYE: CPT | Mod: 26,RT,MH

## 2021-03-07 PROBLEM — M25.551 RIGHT HIP PAIN: Status: ACTIVE | Noted: 2020-12-08

## 2021-03-07 PROBLEM — S32.000A LUMBAR COMPRESSION FRACTURE, CLOSED, INITIAL ENCOUNTER: Status: ACTIVE | Noted: 2019-09-09

## 2021-03-07 PROBLEM — M54.5 LUMBAGO: Status: ACTIVE | Noted: 2020-10-19

## 2021-03-07 NOTE — CONSULT LETTER
[Dear  ___] : Dear  [unfilled], [Courtesy Letter:] : I had the pleasure of seeing your patient, [unfilled], in my office today. [Sincerely,] : Sincerely, [FreeTextEntry2] : Bernadette Puga MD\par [address]\[place] [address] \par \par  [FreeTextEntry1] : This is a very pleasant 66-year-old gentleman returns to our office at a 6-week interval for further discussion regarding his lower back discomfort with radiation to the hips.  Since we saw the patient last he has been undergoing physical therapy.  The patient indicates he is having much more profound problems with hip pain bilaterally but it is worse on the right-hand side.  He is now walking with the assistance of a cane and has significant difficulty going up or down stairs.  Bowel and bladder function have remained normal.  He is taking ibuprofen and Tylenol on a consistent basis.  The patient indicates that he is now anticipating a consultation by orthopedic surgery at [facility] on February 4.  With respect to his back pain it is dull and achy in nature.  The pain in his hip is sharp and stabbing intermittently.  Prolonged walking or standing certainly makes this pain worse.  He denies any focal weakness of the lower extremities.  He has not had any trips or falls.\par \par I have reviewed once again with the patient his previous MRI scan of the lumbar spine.  I have pointed out once again the minimal compression fracture T12 is stable with no compromise of the central canal and no progression over time.  Furthermore there is only minor disc protrusion at L4-5 which would not correlate well with the patient's stated progressive symptoms.\par \par The patient clearly has discomfort with standing and with palpation of the hip joints.  It appears that on the right-hand side in particular there has been progression which does not correlate with a radiculopathy.\par \par I have reviewed in detail with the patient that his lumbar MRI scan does not correlate well with his progressive stated symptoms.  I am more concerned that he may have a progressive hip joint problem which is actually the underlying cause for his back pain in response to the primary difficulty.  Encouraged the patient to move forward with formal orthopedic evaluation.  At this time I have not arranged for any further follow-up but would be more than pleased to see the patient again at any time should the need arise.\par \par Thank you for very kindly including me in the ongoing evaluation and support of your patient.  Please do not hesitate to contact me should you have any questions or concerns regarding this assessment, or the patient's ongoing follow-up care. [FreeTextEntry3] : Alexys Gilliam MD, PhD, FRCPSC \par Attending Neurosurgeon \par  of Neurosurgery \par [facility] \par [address] \[place] [address] \par Office: [phone] \par Fax: [phone]\par \par

## 2021-03-09 ENCOUNTER — APPOINTMENT (OUTPATIENT)
Dept: DISASTER EMERGENCY | Facility: CLINIC | Age: 66
End: 2021-03-09

## 2021-03-10 LAB — SARS-COV-2 N GENE NPH QL NAA+PROBE: NOT DETECTED

## 2021-03-11 ENCOUNTER — TRANSCRIPTION ENCOUNTER (OUTPATIENT)
Age: 66
End: 2021-03-11

## 2021-03-12 ENCOUNTER — APPOINTMENT (OUTPATIENT)
Dept: ORTHOPEDIC SURGERY | Facility: HOSPITAL | Age: 66
End: 2021-03-12

## 2021-03-12 ENCOUNTER — INPATIENT (INPATIENT)
Facility: HOSPITAL | Age: 66
LOS: 0 days | Discharge: HOME | DRG: 470 | End: 2021-03-13
Attending: ORTHOPAEDIC SURGERY | Admitting: ORTHOPAEDIC SURGERY
Payer: MEDICARE

## 2021-03-12 ENCOUNTER — TRANSCRIPTION ENCOUNTER (OUTPATIENT)
Age: 66
End: 2021-03-12

## 2021-03-12 VITALS
TEMPERATURE: 98 F | HEART RATE: 70 BPM | SYSTOLIC BLOOD PRESSURE: 129 MMHG | RESPIRATION RATE: 16 BRPM | OXYGEN SATURATION: 100 % | HEIGHT: 73 IN | DIASTOLIC BLOOD PRESSURE: 88 MMHG | WEIGHT: 214.95 LBS

## 2021-03-12 DIAGNOSIS — M16.11 UNILATERAL PRIMARY OSTEOARTHRITIS, RIGHT HIP: ICD-10-CM

## 2021-03-12 DIAGNOSIS — Z98.890 OTHER SPECIFIED POSTPROCEDURAL STATES: Chronic | ICD-10-CM

## 2021-03-12 LAB
ABO RH CONFIRMATION: SIGNIFICANT CHANGE UP
GLUCOSE BLDC GLUCOMTR-MCNC: 85 MG/DL — SIGNIFICANT CHANGE UP (ref 70–99)
GLUCOSE BLDC GLUCOMTR-MCNC: 88 MG/DL — SIGNIFICANT CHANGE UP (ref 70–99)

## 2021-03-12 PROCEDURE — 0055T BONE SRGRY CMPTR CT/MRI IMAG: CPT | Mod: RT

## 2021-03-12 PROCEDURE — 73501 X-RAY EXAM HIP UNI 1 VIEW: CPT | Mod: 26,RT

## 2021-03-12 PROCEDURE — 0055T BONE SRGRY CMPTR CT/MRI IMAG: CPT | Mod: AS,RT

## 2021-03-12 PROCEDURE — 27130 TOTAL HIP ARTHROPLASTY: CPT | Mod: RT

## 2021-03-12 PROCEDURE — 27130 TOTAL HIP ARTHROPLASTY: CPT | Mod: AS,RT

## 2021-03-12 RX ORDER — HYDROMORPHONE HYDROCHLORIDE 2 MG/ML
4 INJECTION INTRAMUSCULAR; INTRAVENOUS; SUBCUTANEOUS
Refills: 0 | Status: DISCONTINUED | OUTPATIENT
Start: 2021-03-12 | End: 2021-03-13

## 2021-03-12 RX ORDER — TRANEXAMIC ACID 100 MG/ML
1000 INJECTION, SOLUTION INTRAVENOUS ONCE
Refills: 0 | Status: DISCONTINUED | OUTPATIENT
Start: 2021-03-12 | End: 2021-03-12

## 2021-03-12 RX ORDER — CEFAZOLIN SODIUM 1 G
2000 VIAL (EA) INJECTION
Refills: 0 | Status: COMPLETED | OUTPATIENT
Start: 2021-03-12 | End: 2021-03-13

## 2021-03-12 RX ORDER — SENNA PLUS 8.6 MG/1
2 TABLET ORAL AT BEDTIME
Refills: 0 | Status: DISCONTINUED | OUTPATIENT
Start: 2021-03-12 | End: 2021-03-13

## 2021-03-12 RX ORDER — PANTOPRAZOLE SODIUM 20 MG/1
40 TABLET, DELAYED RELEASE ORAL
Refills: 0 | Status: DISCONTINUED | OUTPATIENT
Start: 2021-03-12 | End: 2021-03-13

## 2021-03-12 RX ORDER — CELECOXIB 200 MG/1
400 CAPSULE ORAL ONCE
Refills: 0 | Status: COMPLETED | OUTPATIENT
Start: 2021-03-12 | End: 2021-03-12

## 2021-03-12 RX ORDER — HYDROMORPHONE HYDROCHLORIDE 2 MG/ML
0.5 INJECTION INTRAMUSCULAR; INTRAVENOUS; SUBCUTANEOUS
Refills: 0 | Status: DISCONTINUED | OUTPATIENT
Start: 2021-03-12 | End: 2021-03-13

## 2021-03-12 RX ORDER — OXYCODONE HYDROCHLORIDE 5 MG/1
10 TABLET ORAL
Refills: 0 | Status: DISCONTINUED | OUTPATIENT
Start: 2021-03-12 | End: 2021-03-13

## 2021-03-12 RX ORDER — CEFAZOLIN SODIUM 1 G
2000 VIAL (EA) INJECTION ONCE
Refills: 0 | Status: DISCONTINUED | OUTPATIENT
Start: 2021-03-12 | End: 2021-03-12

## 2021-03-12 RX ORDER — CELECOXIB 200 MG/1
200 CAPSULE ORAL EVERY 12 HOURS
Refills: 0 | Status: DISCONTINUED | OUTPATIENT
Start: 2021-03-14 | End: 2021-03-13

## 2021-03-12 RX ORDER — LISINOPRIL 2.5 MG/1
2.5 TABLET ORAL DAILY
Refills: 0 | Status: DISCONTINUED | OUTPATIENT
Start: 2021-03-14 | End: 2021-03-13

## 2021-03-12 RX ORDER — APREPITANT 80 MG/1
40 CAPSULE ORAL ONCE
Refills: 0 | Status: COMPLETED | OUTPATIENT
Start: 2021-03-12 | End: 2021-03-12

## 2021-03-12 RX ORDER — ACETAMINOPHEN 500 MG
975 TABLET ORAL ONCE
Refills: 0 | Status: COMPLETED | OUTPATIENT
Start: 2021-03-12 | End: 2021-03-12

## 2021-03-12 RX ORDER — FENTANYL CITRATE 50 UG/ML
50 INJECTION INTRAVENOUS
Refills: 0 | Status: DISCONTINUED | OUTPATIENT
Start: 2021-03-12 | End: 2021-03-12

## 2021-03-12 RX ORDER — MAGNESIUM HYDROXIDE 400 MG/1
30 TABLET, CHEWABLE ORAL DAILY
Refills: 0 | Status: DISCONTINUED | OUTPATIENT
Start: 2021-03-12 | End: 2021-03-13

## 2021-03-12 RX ORDER — SODIUM CHLORIDE 9 MG/ML
500 INJECTION INTRAMUSCULAR; INTRAVENOUS; SUBCUTANEOUS ONCE
Refills: 0 | Status: COMPLETED | OUTPATIENT
Start: 2021-03-12 | End: 2021-03-12

## 2021-03-12 RX ORDER — OXYCODONE HYDROCHLORIDE 5 MG/1
5 TABLET ORAL
Refills: 0 | Status: DISCONTINUED | OUTPATIENT
Start: 2021-03-12 | End: 2021-03-13

## 2021-03-12 RX ORDER — ACETAMINOPHEN 500 MG
975 TABLET ORAL EVERY 8 HOURS
Refills: 0 | Status: DISCONTINUED | OUTPATIENT
Start: 2021-03-12 | End: 2021-03-13

## 2021-03-12 RX ORDER — SODIUM CHLORIDE 9 MG/ML
1000 INJECTION INTRAMUSCULAR; INTRAVENOUS; SUBCUTANEOUS
Refills: 0 | Status: DISCONTINUED | OUTPATIENT
Start: 2021-03-12 | End: 2021-03-13

## 2021-03-12 RX ORDER — ONDANSETRON 8 MG/1
4 TABLET, FILM COATED ORAL ONCE
Refills: 0 | Status: DISCONTINUED | OUTPATIENT
Start: 2021-03-12 | End: 2021-03-12

## 2021-03-12 RX ORDER — ONDANSETRON 8 MG/1
4 TABLET, FILM COATED ORAL EVERY 6 HOURS
Refills: 0 | Status: DISCONTINUED | OUTPATIENT
Start: 2021-03-12 | End: 2021-03-13

## 2021-03-12 RX ORDER — ASPIRIN/CALCIUM CARB/MAGNESIUM 324 MG
325 TABLET ORAL
Refills: 0 | Status: DISCONTINUED | OUTPATIENT
Start: 2021-03-13 | End: 2021-03-13

## 2021-03-12 RX ORDER — POLYETHYLENE GLYCOL 3350 17 G/17G
17 POWDER, FOR SOLUTION ORAL AT BEDTIME
Refills: 0 | Status: DISCONTINUED | OUTPATIENT
Start: 2021-03-12 | End: 2021-03-13

## 2021-03-12 RX ORDER — ATORVASTATIN CALCIUM 80 MG/1
10 TABLET, FILM COATED ORAL AT BEDTIME
Refills: 0 | Status: DISCONTINUED | OUTPATIENT
Start: 2021-03-12 | End: 2021-03-13

## 2021-03-12 RX ORDER — KETOROLAC TROMETHAMINE 30 MG/ML
15 SYRINGE (ML) INJECTION EVERY 6 HOURS
Refills: 0 | Status: DISCONTINUED | OUTPATIENT
Start: 2021-03-12 | End: 2021-03-13

## 2021-03-12 RX ORDER — SODIUM CHLORIDE 9 MG/ML
1000 INJECTION, SOLUTION INTRAVENOUS
Refills: 0 | Status: DISCONTINUED | OUTPATIENT
Start: 2021-03-12 | End: 2021-03-12

## 2021-03-12 RX ORDER — SODIUM CHLORIDE 9 MG/ML
3 INJECTION INTRAMUSCULAR; INTRAVENOUS; SUBCUTANEOUS EVERY 8 HOURS
Refills: 0 | Status: DISCONTINUED | OUTPATIENT
Start: 2021-03-12 | End: 2021-03-12

## 2021-03-12 RX ADMIN — Medication 15 MILLIGRAM(S): at 19:00

## 2021-03-12 RX ADMIN — ATORVASTATIN CALCIUM 10 MILLIGRAM(S): 80 TABLET, FILM COATED ORAL at 21:32

## 2021-03-12 RX ADMIN — SODIUM CHLORIDE 100 MILLILITER(S): 9 INJECTION INTRAMUSCULAR; INTRAVENOUS; SUBCUTANEOUS at 21:31

## 2021-03-12 RX ADMIN — Medication 15 MILLIGRAM(S): at 23:17

## 2021-03-12 RX ADMIN — POLYETHYLENE GLYCOL 3350 17 GRAM(S): 17 POWDER, FOR SOLUTION ORAL at 21:32

## 2021-03-12 RX ADMIN — Medication 975 MILLIGRAM(S): at 10:30

## 2021-03-12 RX ADMIN — CELECOXIB 400 MILLIGRAM(S): 200 CAPSULE ORAL at 10:30

## 2021-03-12 RX ADMIN — APREPITANT 40 MILLIGRAM(S): 80 CAPSULE ORAL at 10:30

## 2021-03-12 RX ADMIN — SODIUM CHLORIDE 500 MILLILITER(S): 9 INJECTION INTRAMUSCULAR; INTRAVENOUS; SUBCUTANEOUS at 18:08

## 2021-03-12 RX ADMIN — Medication 975 MILLIGRAM(S): at 21:34

## 2021-03-12 RX ADMIN — Medication 975 MILLIGRAM(S): at 21:32

## 2021-03-12 RX ADMIN — Medication 15 MILLIGRAM(S): at 18:50

## 2021-03-12 RX ADMIN — SENNA PLUS 2 TABLET(S): 8.6 TABLET ORAL at 21:32

## 2021-03-12 RX ADMIN — Medication 100 MILLIGRAM(S): at 21:32

## 2021-03-12 NOTE — DISCHARGE NOTE PROVIDER - NSDCFUSCHEDAPPT_GEN_ALL_CORE_FT
DARNELL CHAVEZ ; 04/06/2021 ; NP [facility]  DARNELL CHAVEZ ; 04/27/2021 ; NP [facility]

## 2021-03-12 NOTE — ASU PREOP CHECKLIST - WEIGHT IN LBS
Post-Anesthesia Evaluation and Assessment    Patient: Karen Ruffin MRN: [id number]  SSN: [id number]    YOB: 1982  Age: 30 y.o. Sex: female       Cardiovascular Function/Vital Signs  Visit Vitals    /57    Pulse 77    Temp 37.1 °C (98.7 °F)    Resp 12    Ht 5' 4\" (1.626 m)    Wt 64.9 kg (143 lb 1.3 oz)    SpO2 98%    BMI 24.56 kg/m2       Patient is status post total IV anesthesia, MAC anesthesia for Procedure(s): HYSTEROSCOPY / IUD REMOVAL . Nausea/Vomiting: None    Postoperative hydration reviewed and adequate. Pain:  Pain Scale 1: Numeric (0 - 10) (05/22/17 0842)  Pain Intensity 1: 0 (05/22/17 0842)   Managed    Neurological Status:   Neuro (WDL): Within Defined Limits (05/22/17 0710)  Neuro  Neurologic State: Drowsy (05/22/17 0830)  Orientation Level: Appropriate for age;Oriented X4 (05/22/17 0710)  Cognition: Appropriate decision making (05/22/17 0710)  Speech: Appropriate for age;Clear (05/22/17 0710)  LUE Motor Response: Purposeful (05/22/17 0710)  LLE Motor Response: Purposeful (05/22/17 0710)  RUE Motor Response: Purposeful (05/22/17 0710)  RLE Motor Response: Purposeful (05/22/17 0710)   At baseline    Mental Status and Level of Consciousness: Arousable    Pulmonary Status:   O2 Device: Room air (05/22/17 0842)   Adequate oxygenation and airway patent    Complications related to anesthesia: None    Post-anesthesia assessment completed.  No concerns    Signed By: Nena Taylor MD     May 22, 2017 214.9

## 2021-03-12 NOTE — DISCHARGE NOTE PROVIDER - NSDCMRMEDTOKEN_GEN_ALL_CORE_FT
atorvastatin 10 mg oral tablet: 1 tab(s) orally once a day  collagen topical: Apply topically to affected area once a day  CoQ10 300 mg oral capsule: 1 cap(s) orally once a day  ibuprofen 400 mg oral tablet:   lisinopril 2.5 mg oral tablet: 1 tab(s) orally once a day  Omega-3 oral capsule: orally once a day  Turmeric: orally once a day  Tylenol 500 mg oral tablet:    aspirin 325 mg oral tablet: 1 tab(s) orally 2 times a day  atorvastatin 10 mg oral tablet: 1 tab(s) orally once a day  celecoxib 200 mg oral capsule: 1 cap(s) orally every 12 hours  CoQ10 300 mg oral capsule: 1 cap(s) orally once a day  lisinopril 2.5 mg oral tablet: 1 tab(s) orally once a day  Omega-3 oral capsule: orally once a day  omeprazole 20 mg oral delayed release capsule: 1 cap(s) orally once a day   oxyCODONE 5 mg oral tablet: 1-2  tab(s) orally every 4 to 6 hours, As Needed - Pain  MDD:7  Senna S 50 mg-8.6 mg oral tablet: 2 tab(s) orally once a day (at bedtime)

## 2021-03-12 NOTE — BRIEF OPERATIVE NOTE - NSICDXBRIEFPREOP_GEN_ALL_CORE_FT
PRE-OP DIAGNOSIS:  Unilateral primary osteoarthritis, right hip 12-Mar-2021 17:14:47  Jay Dominguez

## 2021-03-12 NOTE — DISCHARGE NOTE PROVIDER - NSDCCPTREATMENT_GEN_ALL_CORE_FT
PRINCIPAL PROCEDURE  Procedure: Robot-assisted total replacement of right hip  Findings and Treatment:

## 2021-03-12 NOTE — PROGRESS NOTE ADULT - SUBJECTIVE AND OBJECTIVE BOX
History: Patient is status post RIGHT anterior total hip arthroplasty on 3/12, POD # 0.   Patient is doing well and is comfortable.   The patient's pain is controlled using the prescribed pain medications.   The patient is participating in physical therapy.   Denies nausea, vomiting, chest pain, shortness of breath, abdominal pain or fever. No new complaints.    Vital Signs Last 24 Hrs  T(C): 36.3 (12 Mar 2021 20:00), Max: 36.8 (12 Mar 2021 10:03)  T(F): 97.3 (12 Mar 2021 20:00), Max: 98.3 (12 Mar 2021 10:03)  HR: 64 (12 Mar 2021 20:15) (55 - 70)  BP: 145/88 (12 Mar 2021 20:15) (128/78 - 150/86)  BP(mean): --  RR: 16 (12 Mar 2021 20:15) (12 - 16)  SpO2: 99% (12 Mar 2021 20:15) (98% - 100%)    Physical exam:   The right hip dressing is clean, dry and intact. No drainage or discharge.   The calf is supple nontender. Passive range of motion is acceptable to due postoperative pain. No calf tenderness.   Sensation to light touch is grossly intact distally.   Motor function distally is 5/5. No foot drop.   2+ dorsalis pedis pulse. Capillary refill is less than 2 seconds. No cyanosis.    Primary Orthopedic Assessment:  • s/p RIGHT ANTERIOR total hip replacement    Plan:   • DVT prophylaxis as prescribed, including use of compression devices and ankle pumps  • Continue physical therapy  • Weightbearing as tolerated of the RIGHT lower extremity with assistance of a walker  • Incentive spirometry encouraged  • Pain control as clinically indicated  • Anterior hip precautions reviewed with patient  • ANCEF per scip

## 2021-03-12 NOTE — DISCHARGE NOTE PROVIDER - NSDCHHBASESERVICE_GEN_ALL_CORE
Physical therapy community/leisure/self-care/home management home management/self-care/community/leisure

## 2021-03-12 NOTE — DISCHARGE NOTE PROVIDER - CARE PROVIDER_API CALL
Shahab Candelaria)  Orthopaedic Surgery  [address]  [place] [address]  Phone: [phone]  Fax: [phone]  Follow Up Time:

## 2021-03-12 NOTE — DISCHARGE NOTE PROVIDER - NSDCFUADDINST_GEN_ALL_CORE_FT
The patient will be seen in the office between 2-3 weeks for wound check.   **Your first post-operative visit has been scheduled prior to your admission. PLEASE CONTACT OFFICE TO CONFIRM THE APPOINTMENT DATE. Tape will be removed at that time.  **  The silver based dressing is to be removed 7 days from the date of surgery(3/19).   ** CONTACT THE OFFICE IF THE FOLLOWING DEVELOP:  - the dressing becomes soiled or saturated  - you develop a fever greater that 101F  - the wound becomes red or you develop blistering around the wound  * Patient may shower after post-op day #3(3/15).   * The patient will continue home PT consistent with anterior total hip replacement protocol and will continue to practice anterior total hip precautions for a minimum of 6 week. Transition to outpatient PT will occur at the time of the first office visit.   * The patient is FULL weight bearing.  * The patient will continue ASPIRIN 325mg twice daily for 6 weeks after surgery for blood clot prevention.*** While on aspirin, the patient will take daily omeprazole or other similar medication to protect the stomach from irritation.   * The patient will take OXYCODONE AND TYLENOL for pain control and adjust according to prescription and patient needs. Contact the office if pain increases while taking prescribed pain medications or related concerns develop.   * Celebrex will be taken twice daily for 3 weeks for pain control and prevention of excessive bone growth. Additional prescription may be requested at your office follow-up visit.  * The patient will take Senna S while taking oxycodone to prevent narcotic associated constipation.  Additionally, increase water intake (drink at least 8 glasses of water daily) and try adding fiber to the diet by eating fruits, vegetables and foods that are rich in grains. If constipation is experienced, contact the medical/primary care provider to discuss further treatment options.  * To avoid injury at home:  - continue use of rolling walker until cleared by physical therapist  - have family or friend remove all throw rug or objects in hallways that may present a trip hazard.  - if you experience any dizziness or medical concerns, call your medical doctor or  911.  * The implant may activate metal detection devices.

## 2021-03-12 NOTE — BRIEF OPERATIVE NOTE - NSICDXBRIEFPOSTOP_GEN_ALL_CORE_FT
POST-OP DIAGNOSIS:  Unilateral primary osteoarthritis, right hip 12-Mar-2021 17:14:57  Jay Dominguez

## 2021-03-13 ENCOUNTER — TRANSCRIPTION ENCOUNTER (OUTPATIENT)
Age: 66
End: 2021-03-13

## 2021-03-13 VITALS
SYSTOLIC BLOOD PRESSURE: 133 MMHG | DIASTOLIC BLOOD PRESSURE: 89 MMHG | RESPIRATION RATE: 18 BRPM | HEART RATE: 81 BPM | TEMPERATURE: 98 F | OXYGEN SATURATION: 95 %

## 2021-03-13 LAB
ANION GAP SERPL CALC-SCNC: 10 MMOL/L — SIGNIFICANT CHANGE UP (ref 5–17)
BUN SERPL-MCNC: 25 MG/DL — HIGH (ref 8–20)
CALCIUM SERPL-MCNC: 8.6 MG/DL — SIGNIFICANT CHANGE UP (ref 8.6–10.2)
CHLORIDE SERPL-SCNC: 105 MMOL/L — SIGNIFICANT CHANGE UP (ref 98–107)
CO2 SERPL-SCNC: 24 MMOL/L — SIGNIFICANT CHANGE UP (ref 22–29)
CREAT SERPL-MCNC: 1.11 MG/DL — SIGNIFICANT CHANGE UP (ref 0.5–1.3)
GLUCOSE SERPL-MCNC: 80 MG/DL — SIGNIFICANT CHANGE UP (ref 70–99)
HCT VFR BLD CALC: 35.7 % — LOW (ref 39–50)
HGB BLD-MCNC: 11.9 G/DL — LOW (ref 13–17)
MCHC RBC-ENTMCNC: 31.9 PG — SIGNIFICANT CHANGE UP (ref 27–34)
MCHC RBC-ENTMCNC: 33.3 GM/DL — SIGNIFICANT CHANGE UP (ref 32–36)
MCV RBC AUTO: 95.7 FL — SIGNIFICANT CHANGE UP (ref 80–100)
PLATELET # BLD AUTO: 188 K/UL — SIGNIFICANT CHANGE UP (ref 150–400)
POTASSIUM SERPL-MCNC: 4.5 MMOL/L — SIGNIFICANT CHANGE UP (ref 3.5–5.3)
POTASSIUM SERPL-SCNC: 4.5 MMOL/L — SIGNIFICANT CHANGE UP (ref 3.5–5.3)
RBC # BLD: 3.73 M/UL — LOW (ref 4.2–5.8)
RBC # FLD: 13 % — SIGNIFICANT CHANGE UP (ref 10.3–14.5)
SODIUM SERPL-SCNC: 139 MMOL/L — SIGNIFICANT CHANGE UP (ref 135–145)
WBC # BLD: 6.58 K/UL — SIGNIFICANT CHANGE UP (ref 3.8–10.5)
WBC # FLD AUTO: 6.58 K/UL — SIGNIFICANT CHANGE UP (ref 3.8–10.5)

## 2021-03-13 PROCEDURE — 99222 1ST HOSP IP/OBS MODERATE 55: CPT

## 2021-03-13 RX ORDER — SENNOSIDES/DOCUSATE SODIUM 8.6MG-50MG
2 TABLET ORAL
Qty: 30 | Refills: 0
Start: 2021-03-13 | End: 2021-03-27

## 2021-03-13 RX ORDER — MILK THISTLE 150 MG
0 CAPSULE ORAL
Qty: 0 | Refills: 0 | DISCHARGE

## 2021-03-13 RX ORDER — OMEPRAZOLE 10 MG/1
1 CAPSULE, DELAYED RELEASE ORAL
Qty: 42 | Refills: 0
Start: 2021-03-13 | End: 2021-04-23

## 2021-03-13 RX ORDER — ASPIRIN/CALCIUM CARB/MAGNESIUM 324 MG
1 TABLET ORAL
Qty: 84 | Refills: 0
Start: 2021-03-13 | End: 2021-04-23

## 2021-03-13 RX ORDER — CELECOXIB 200 MG/1
1 CAPSULE ORAL
Qty: 42 | Refills: 0
Start: 2021-03-13 | End: 2021-04-02

## 2021-03-13 RX ORDER — IBUPROFEN 200 MG
0 TABLET ORAL
Qty: 0 | Refills: 0 | DISCHARGE

## 2021-03-13 RX ORDER — ACETAMINOPHEN 500 MG
0 TABLET ORAL
Qty: 0 | Refills: 0 | DISCHARGE

## 2021-03-13 RX ORDER — OXYCODONE HYDROCHLORIDE 5 MG/1
1 TABLET ORAL
Qty: 50 | Refills: 0
Start: 2021-03-13

## 2021-03-13 RX ADMIN — Medication 15 MILLIGRAM(S): at 05:17

## 2021-03-13 RX ADMIN — PANTOPRAZOLE SODIUM 40 MILLIGRAM(S): 20 TABLET, DELAYED RELEASE ORAL at 05:14

## 2021-03-13 RX ADMIN — Medication 325 MILLIGRAM(S): at 05:14

## 2021-03-13 RX ADMIN — Medication 15 MILLIGRAM(S): at 05:18

## 2021-03-13 RX ADMIN — Medication 975 MILLIGRAM(S): at 05:14

## 2021-03-13 RX ADMIN — Medication 975 MILLIGRAM(S): at 05:17

## 2021-03-13 RX ADMIN — Medication 15 MILLIGRAM(S): at 12:53

## 2021-03-13 RX ADMIN — Medication 100 MILLIGRAM(S): at 05:15

## 2021-03-13 RX ADMIN — SODIUM CHLORIDE 100 MILLILITER(S): 9 INJECTION INTRAMUSCULAR; INTRAVENOUS; SUBCUTANEOUS at 05:14

## 2021-03-13 NOTE — PHYSICAL THERAPY INITIAL EVALUATION ADULT - RANGE OF MOTION EXAMINATION, REHAB EVAL
except right Hip flexion 0-90/bilateral upper extremity ROM was WFL (within functional limits)/bilateral lower extremity ROM was WFL (within functional limits)

## 2021-03-13 NOTE — PROGRESS NOTE ADULT - SUBJECTIVE AND OBJECTIVE BOX
DARNELL CHAVEZ    [id number]    History: Patient is status post right anterior total hip arthroplasty, POD #1. Patient is doing well and is comfortable. The patient's pain is controlled using the prescribed pain medications. The patient is participating in physical therapy. He ambulated down petersen and did stairs this am. Denies nausea, vomiting, chest pain, shortness of breath, abdominal pain or dizziness. No new complaints.                              11.9   6.58  )-----------( 188      ( 13 Mar 2021 07:11 )             35.7     03-13    139  |  105  |  25.0<H>  ----------------------------<  80  4.5   |  24.0  |  1.11    Ca    8.6      13 Mar 2021 07:11        MEDICATIONS  (STANDING):  acetaminophen   Tablet .. 975 milliGRAM(s) Oral every 8 hours  aspirin 325 milliGRAM(s) Oral two times a day  atorvastatin 10 milliGRAM(s) Oral at bedtime  ketorolac   Injectable 15 milliGRAM(s) IV Push every 6 hours  pantoprazole    Tablet 40 milliGRAM(s) Oral before breakfast  polyethylene glycol 3350 17 Gram(s) Oral at bedtime  senna 2 Tablet(s) Oral at bedtime  sodium chloride 0.9%. 1000 milliLiter(s) (100 mL/Hr) IV Continuous <Continuous>    MEDICATIONS  (PRN):  aluminum hydroxide/magnesium hydroxide/simethicone Suspension 30 milliLiter(s) Oral four times a day PRN Indigestion  HYDROmorphone   Tablet 4 milliGRAM(s) Oral every 3 hours PRN Severe Pain (7 - 10)  HYDROmorphone  Injectable 0.5 milliGRAM(s) IV Push every 3 hours PRN Breakthrough  magnesium hydroxide Suspension 30 milliLiter(s) Oral daily PRN Constipation  ondansetron Injectable 4 milliGRAM(s) IV Push every 6 hours PRN Nausea and/or Vomiting  oxyCODONE    IR 5 milliGRAM(s) Oral every 3 hours PRN Mild Pain (1 - 3)  oxyCODONE    IR 10 milliGRAM(s) Oral every 3 hours PRN Moderate Pain (4 - 6)    Vital Signs Last 24 Hrs  T(C): 36.7 (13 Mar 2021 04:16), Max: 36.8 (12 Mar 2021 10:03)  T(F): 98.1 (13 Mar 2021 04:16), Max: 98.3 (12 Mar 2021 10:03)  HR: 64 (13 Mar 2021 04:16) (55 - 80)  BP: 128/89 (13 Mar 2021 04:16) (122/81 - 172/70)  BP(mean): --  RR: 19 (13 Mar 2021 04:16) (12 - 19)  SpO2: 96% (13 Mar 2021 04:16) (94% - 100%)  Lying in bed in NAD, awake and alert    Physical exam: Right lower extremity- The right hip mepilex dressing is clean, dry and intact. No drainage or discharge. No erythema is noted. No blistering. Mild ecchymosis. The calf is supple. No calf tenderness. Sensation to light touch is grossly intact distally. The lateral cutaneous nerve is intact. Motor function distally is 5/5. No foot drop. +EHL/FHL. +DF/PF. 2+ dorsalis pedis pulse. Capillary refill is less than 2 seconds. No cyanosis.    Primary Orthopedic Assessment:  • s/p RIGHT ANTERIOR total hip replacement, POD#1      Plan:   • DVT prophylaxis as prescribed- ASA 325mg bid, including use of compression devices and ankle pumps  • Continue physical therapy- cleared, did stairs  • Weightbearing as tolerated of the right lower extremity with assistance of a walker  • Incentive spirometry encouraged  • Pain control as clinically indicated  • Anterior hip precautions reviewed with patient  • Discharge planning – anticipated discharge is Home after cleared by medicine, likely today

## 2021-03-13 NOTE — CONSULT NOTE ADULT - SUBJECTIVE AND OBJECTIVE BOX
chief complaint : rt hip pain       HPI:  67 y/o male with PMH of HTN, HPL with chronic rt hip pain with failed conservative medical therapies limiting his daily activities presented for an elective rt hip replacement. He is now s/p Rt hip arthroplasty POD#1.      PAST MEDICAL & SURGICAL HISTORY:  Hyperlipemia  Hypertension  History of arthroscopy  left and right- 1986, 1990, 2000        Social History:  Tabacco -   ETOH -   Illicit drug abuse - denies    FAMILY HISTORY:  FH: breast cancer (Sibling)  FH: heart disease (Sibling)  Family history of TIAs (Mother)  FH: brain aneurysm (Father)  FH: pancreatic cancer (Sibling)  FH: colon cancer (Mother)        Allergies    No Known Allergies    Intolerances        HOME MEDICATIONS :   Home Medications:  atorvastatin 10 mg oral tablet: 1 tab(s) orally once a day (12 Mar 2021 10:18)  CoQ10 300 mg oral capsule: 1 cap(s) orally once a day (12 Mar 2021 10:18)  lisinopril 2.5 mg oral tablet: 1 tab(s) orally once a day (12 Mar 2021 10:18)  Omega-3 oral capsule: orally once a day (12 Mar 2021 10:18)          REVIEW OF SYSTEMS:    CONSTITUTIONAL: No fever, fatigue  EYES: No eye pain, visual disturbances, or discharge  NECK: No pain or stiffness  RESPIRATORY: No cough, wheezing, chills or hemoptysis; No shortness of breath  CARDIOVASCULAR: No chest pain, palpitations, dizziness, or leg swelling  GASTROINTESTINAL: No abdominal or epigastric pain. No nausea, vomiting  GENITOURINARY: No dysuria, frequency, hematuria, or incontinence  NEUROLOGICAL: No headaches, memory loss, loss of strength, numbness, or tremors  SKIN: No itching, burning, rashes, or lesions   ENDOCRINE: No heat or cold intolerance; No hair loss      MEDICATIONS  (STANDING):  acetaminophen   Tablet .. 975 milliGRAM(s) Oral every 8 hours  aspirin 325 milliGRAM(s) Oral two times a day  atorvastatin 10 milliGRAM(s) Oral at bedtime  ketorolac   Injectable 15 milliGRAM(s) IV Push every 6 hours  pantoprazole    Tablet 40 milliGRAM(s) Oral before breakfast  polyethylene glycol 3350 17 Gram(s) Oral at bedtime  senna 2 Tablet(s) Oral at bedtime  sodium chloride 0.9%. 1000 milliLiter(s) (100 mL/Hr) IV Continuous <Continuous>    MEDICATIONS  (PRN):  aluminum hydroxide/magnesium hydroxide/simethicone Suspension 30 milliLiter(s) Oral four times a day PRN Indigestion  HYDROmorphone   Tablet 4 milliGRAM(s) Oral every 3 hours PRN Severe Pain (7 - 10)  HYDROmorphone  Injectable 0.5 milliGRAM(s) IV Push every 3 hours PRN Breakthrough  magnesium hydroxide Suspension 30 milliLiter(s) Oral daily PRN Constipation  ondansetron Injectable 4 milliGRAM(s) IV Push every 6 hours PRN Nausea and/or Vomiting  oxyCODONE    IR 5 milliGRAM(s) Oral every 3 hours PRN Mild Pain (1 - 3)  oxyCODONE    IR 10 milliGRAM(s) Oral every 3 hours PRN Moderate Pain (4 - 6)      Vital Signs Last 24 Hrs  T(C): 36.7 (13 Mar 2021 04:16), Max: 36.8 (12 Mar 2021 10:03)  T(F): 98.1 (13 Mar 2021 04:16), Max: 98.3 (12 Mar 2021 10:03)  HR: 64 (13 Mar 2021 04:16) (55 - 80)  BP: 128/89 (13 Mar 2021 04:16) (122/81 - 172/70)  BP(mean): --  RR: 19 (13 Mar 2021 04:16) (12 - 19)  SpO2: 96% (13 Mar 2021 04:16) (94% - 100%)    PHYSICAL EXAM:    GENERAL: NAD, well-groomed, well-developed  HEAD:  Atraumatic, Normocephalic  EYES: EOMI, PERRLA, conjunctiva and sclera clear  NECK: Supple  NERVOUS SYSTEM:  Alert & Oriented X3, Good concentration  CHEST/LUNG: CTA  b/l,  no rales, rhonchi  HEART: Regular rate and rhythm; No murmur  ABDOMEN: Soft, Nontender, Nondistended; Bowel sounds present  EXTREMITIES:  No clubbing, cyanosis, or edema ,   SKIN: No rashes or lesions    LABS:                        11.9   6.58  )-----------( 188      ( 13 Mar 2021 07:11 )             35.7     03-13    139  |  105  |  25.0<H>  ----------------------------<  80  4.5   |  24.0  |  1.11    Ca    8.6      13 Mar 2021 07:11              RADIOLOGY & ADDITIONAL STUDIES:      chief complaint : rt hip pain       HPI:  67 y/o male with PMH of HTN, HPL with chronic rt hip pain with failed conservative medical therapies limiting his daily activities presented for an elective rt hip replacement. He is now s/p Rt hip arthroplasty POD#1.      PAST MEDICAL & SURGICAL HISTORY:  Hyperlipemia  Hypertension  History of arthroscopy  left and right- 1986, 1990, 2000        Social History:  Tabacco - former smoker  ETOH - 1-2 times/week   Illicit drug abuse - denies    FAMILY HISTORY:  FH: breast cancer (Sibling)  FH: heart disease (Sibling)  Family history of TIAs (Mother)  FH: brain aneurysm (Father)  FH: pancreatic cancer (Sibling)  FH: colon cancer (Mother)        Allergies    No Known Allergies    Intolerances        HOME MEDICATIONS :   Home Medications:  atorvastatin 10 mg oral tablet: 1 tab(s) orally once a day (12 Mar 2021 10:18)  CoQ10 300 mg oral capsule: 1 cap(s) orally once a day (12 Mar 2021 10:18)  lisinopril 2.5 mg oral tablet: 1 tab(s) orally once a day (12 Mar 2021 10:18)  Omega-3 oral capsule: orally once a day (12 Mar 2021 10:18)          REVIEW OF SYSTEMS:    CONSTITUTIONAL: No fever, fatigue  EYES: No eye pain, visual disturbances, or discharge  NECK: No pain or stiffness  RESPIRATORY: No cough, wheezing, chills or hemoptysis; No shortness of breath  CARDIOVASCULAR: No chest pain, palpitations, dizziness, or leg swelling  GASTROINTESTINAL: No abdominal or epigastric pain. No nausea, vomiting  GENITOURINARY: No dysuria, frequency, hematuria, or incontinence  NEUROLOGICAL: No headaches, memory loss, loss of strength, numbness, or tremors  SKIN: No itching, burning, rashes, or lesions   ENDOCRINE: No heat or cold intolerance; No hair loss      MEDICATIONS  (STANDING):  acetaminophen   Tablet .. 975 milliGRAM(s) Oral every 8 hours  aspirin 325 milliGRAM(s) Oral two times a day  atorvastatin 10 milliGRAM(s) Oral at bedtime  ketorolac   Injectable 15 milliGRAM(s) IV Push every 6 hours  pantoprazole    Tablet 40 milliGRAM(s) Oral before breakfast  polyethylene glycol 3350 17 Gram(s) Oral at bedtime  senna 2 Tablet(s) Oral at bedtime  sodium chloride 0.9%. 1000 milliLiter(s) (100 mL/Hr) IV Continuous <Continuous>    MEDICATIONS  (PRN):  aluminum hydroxide/magnesium hydroxide/simethicone Suspension 30 milliLiter(s) Oral four times a day PRN Indigestion  HYDROmorphone   Tablet 4 milliGRAM(s) Oral every 3 hours PRN Severe Pain (7 - 10)  HYDROmorphone  Injectable 0.5 milliGRAM(s) IV Push every 3 hours PRN Breakthrough  magnesium hydroxide Suspension 30 milliLiter(s) Oral daily PRN Constipation  ondansetron Injectable 4 milliGRAM(s) IV Push every 6 hours PRN Nausea and/or Vomiting  oxyCODONE    IR 5 milliGRAM(s) Oral every 3 hours PRN Mild Pain (1 - 3)  oxyCODONE    IR 10 milliGRAM(s) Oral every 3 hours PRN Moderate Pain (4 - 6)      Vital Signs Last 24 Hrs  T(C): 36.7 (13 Mar 2021 04:16), Max: 36.8 (12 Mar 2021 10:03)  T(F): 98.1 (13 Mar 2021 04:16), Max: 98.3 (12 Mar 2021 10:03)  HR: 64 (13 Mar 2021 04:16) (55 - 80)  BP: 128/89 (13 Mar 2021 04:16) (122/81 - 172/70)  BP(mean): --  RR: 19 (13 Mar 2021 04:16) (12 - 19)  SpO2: 96% (13 Mar 2021 04:16) (94% - 100%)    PHYSICAL EXAM:    GENERAL: NAD, well-groomed, well-developed  HEAD:  Atraumatic, Normocephalic  EYES: EOMI, PERRLA, conjunctiva and sclera clear  NECK: Supple  NERVOUS SYSTEM:  Alert & Oriented X3, Good concentration  CHEST/LUNG: CTA  b/l,  no rales, rhonchi  HEART: Regular rate and rhythm; No murmur  ABDOMEN: Soft, Nontender, Nondistended; Bowel sounds present  EXTREMITIES:  No clubbing, cyanosis, or edema ,   SKIN: No rashes or lesions    LABS:                        11.9   6.58  )-----------( 188      ( 13 Mar 2021 07:11 )             35.7     03-13    139  |  105  |  25.0<H>  ----------------------------<  80  4.5   |  24.0  |  1.11    Ca    8.6      13 Mar 2021 07:11              RADIOLOGY & ADDITIONAL STUDIES:

## 2021-03-13 NOTE — PHYSICAL THERAPY INITIAL EVALUATION ADULT - ASR EQUIP NEEDS DISCH PT EVAL
patient stated that he has shower bench and toilet commode, and 4 wheeled walker at home./3:1 commode/shower chair/rolling walker (5 inch wheels)

## 2021-03-13 NOTE — OCCUPATIONAL THERAPY INITIAL EVALUATION ADULT - LEVEL OF INDEPENDENCE: DRESS LOWER BODY, OT EVAL
to don underwear, pants (including manipulating belt buckle) and to don/doff socks seated in bedside chair/independent

## 2021-03-13 NOTE — OCCUPATIONAL THERAPY INITIAL EVALUATION ADULT - LIVES WITH, PROFILE
Pt reports lives in private house with his sister who is available to assist but minimally as she is recovering from breast cancer. Pt states 2 steps to enter with bilateral hand rails and no steps inside to negotiate. Pt reports bed/bath on main level/other relative

## 2021-03-13 NOTE — PHYSICAL THERAPY INITIAL EVALUATION ADULT - PASSIVE RANGE OF MOTION EXAMINATION, REHAB EVAL
except right Hip flexion 0-90/bilateral upper extremity Passive ROM was WFL (within functional limits)/bilateral lower extremity Passive ROM was WFL (within functional limits)

## 2021-03-13 NOTE — OCCUPATIONAL THERAPY INITIAL EVALUATION ADULT - ASSISTIVE DEVICE FOR TOILET TRANSFER, REHAB EVAL
Pt reports low toilet at home, recommend use of commode over toilet upon discharge to increase safety and independence with ADL of toileting and transfer./grab bars/rolling walker

## 2021-03-13 NOTE — OCCUPATIONAL THERAPY INITIAL EVALUATION ADULT - ADDITIONAL COMMENTS
Pt reports a tub with curtains and 1 grab bar. Pt owns a RW, Rollator, cane, commode, shower chair, transport WC and WC. Pt reports independent PTA with use of cane. Pt is right handed and drives. Pt is independent for all IADLs

## 2021-03-13 NOTE — OCCUPATIONAL THERAPY INITIAL EVALUATION ADULT - SPECIAL TRAINING, OT EVAL
Pt ambulated with modified independence and RW, +external cues around bed area, to/from the bathroom and in the hallway demonstrating good safety awareness and obstacle negotiation. Pt educated in energy conservation techniques including proper breathing and activity pacing.

## 2021-03-13 NOTE — PHYSICAL THERAPY INITIAL EVALUATION ADULT - ACTIVE RANGE OF MOTION EXAMINATION, REHAB EVAL
except right Hip flexion 0-30/bilateral upper extremity Active ROM was WFL (within functional limits)/Right LE Active ROM was WFL (within functional limits)

## 2021-03-13 NOTE — OCCUPATIONAL THERAPY INITIAL EVALUATION ADULT - VISUAL ACUITY
Pt reports glasses (present at time of evaluation); pt does not offer any complaints or changes in vision

## 2021-03-13 NOTE — CONSULT NOTE ADULT - ASSESSMENT
67 y/o male with PMH of HTN, HPL with chronic rt hip pain with failed conservative medical therapies limiting his daily activities presented for an elective rt hip replacement. He is now s/p Rt hip arthroplasty.    # Rt Hip OA - s/p Rt TOMMIE   Pain control   Bowel regimen   Incentive spirometry  DVT px - per ortho   PT    # mild blood loss anemia - expected from surgical loss.   # HTN - resume home medications on discharge   # HPL - ct statin   67 y/o male with PMH of HTN, HPL with chronic rt hip pain with failed conservative medical therapies limiting his daily activities presented for an elective rt hip replacement. He is now s/p Rt hip arthroplasty.    # Rt Hip OA - s/p Rt TOMMIE   Pain control   Bowel regimen   Incentive spirometry  DVT px - per ortho   PT    # mild blood loss anemia - expected from surgical loss.   # HTN - resume home medications on discharge   # HPL - ct statin  # hx elevated LFTs - per PMD - has been evaluated by Gi in past - ? diagnosis - will need repeat LFTs in 2-4 weeks   # hx MAL    67 y/o male with PMH of HTN, HPL with chronic rt hip pain with failed conservative medical therapies limiting his daily activities presented for an elective rt hip replacement. He is now s/p Rt hip arthroplasty.    # Rt Hip OA - s/p Rt TOMMIE   Pain control   Bowel regimen   Incentive spirometry  DVT px - per ortho   PT    # mild blood loss anemia - expected from surgical loss.   # HTN - resume home medications on discharge   # HPL - ct statin  # hx elevated LFTs - per PMD - has been evaluated by Gi in past - no diagnosis - will need repeat LFTs in 2-4 weeks. instructed to avoid OTC herbal supplements.   # hx MAL - not on CPAP  at home     Medically stable for discharge.

## 2021-03-13 NOTE — DISCHARGE NOTE NURSING/CASE MANAGEMENT/SOCIAL WORK - PATIENT PORTAL LINK FT
You can access the FollowMyHealth Patient Portal offered by [facility] by registering at the following website: [email or web address]. By joining [facility]’s FollowMyHealth portal, you will also be able to view your health information using other applications (apps) compatible with our system.

## 2021-03-15 LAB — GLUCOSE BLDC GLUCOMTR-MCNC: 92 MG/DL — SIGNIFICANT CHANGE UP (ref 70–99)

## 2021-03-16 PROCEDURE — 97163 PT EVAL HIGH COMPLEX 45 MIN: CPT

## 2021-03-16 PROCEDURE — 73501 X-RAY EXAM HIP UNI 1 VIEW: CPT

## 2021-03-16 PROCEDURE — 97167 OT EVAL HIGH COMPLEX 60 MIN: CPT

## 2021-03-16 PROCEDURE — C1889: CPT

## 2021-03-16 PROCEDURE — 80048 BASIC METABOLIC PNL TOTAL CA: CPT

## 2021-03-16 PROCEDURE — 85027 COMPLETE CBC AUTOMATED: CPT

## 2021-03-16 PROCEDURE — 82962 GLUCOSE BLOOD TEST: CPT

## 2021-03-16 PROCEDURE — C1713: CPT

## 2021-03-16 PROCEDURE — 97116 GAIT TRAINING THERAPY: CPT

## 2021-03-16 PROCEDURE — C1776: CPT

## 2021-03-16 PROCEDURE — 97110 THERAPEUTIC EXERCISES: CPT

## 2021-03-16 PROCEDURE — 76000 FLUOROSCOPY <1 HR PHYS/QHP: CPT

## 2021-03-16 PROCEDURE — 36415 COLL VENOUS BLD VENIPUNCTURE: CPT

## 2021-03-16 PROCEDURE — S2900: CPT

## 2021-04-01 ENCOUNTER — APPOINTMENT (OUTPATIENT)
Dept: ORTHOPEDIC SURGERY | Facility: CLINIC | Age: 66
End: 2021-04-01
Payer: MEDICARE

## 2021-04-01 VITALS
SYSTOLIC BLOOD PRESSURE: 133 MMHG | HEART RATE: 67 BPM | WEIGHT: 205 LBS | BODY MASS INDEX: 27.17 KG/M2 | HEIGHT: 73 IN | DIASTOLIC BLOOD PRESSURE: 86 MMHG

## 2021-04-01 DIAGNOSIS — Z96.641 PRESENCE OF RIGHT ARTIFICIAL HIP JOINT: ICD-10-CM

## 2021-04-01 PROCEDURE — 99024 POSTOP FOLLOW-UP VISIT: CPT

## 2021-04-01 PROCEDURE — 73502 X-RAY EXAM HIP UNI 2-3 VIEWS: CPT | Mod: RT

## 2021-04-01 NOTE — ADDENDUM
[FreeTextEntry1] : This note was authored by Eric Gonzalez working as a medical scribe for Dr. Shahab Candelaria. The note was reviewed, edited, and revised by Dr. Shahab Candelaria whom is in agreement with the exam findings, imaging findings, and treatment plan. 04/01/2021.

## 2021-04-01 NOTE — HISTORY OF PRESENT ILLNESS
[Doing Well] : is doing well [Excellent Pain Control] : has excellent pain control [No Sign of Infection] : is showing no signs of infection [Erythema] : not erythematous [Discharge] : absent of discharge [Dehiscence] : not dehisced [de-identified] : S/P Right roboticassisted anterior total hip replacement with Efren.  DOS 3/12/21\par  [de-identified] : The patient is a 66 year old male 3 weeks s/p right anterior THR. He is ambulating and transferring well with a cane. He has concluded home therapy at this time and begun outpatient. For DVT prophylaxis he is on aspirin twice per day. Pain is controlled well with oxycodone, Celebrex, and Tylenol. He denies systemic symptoms of fever or chills. He denies drainage from the incision site.  [de-identified] : Exam of the right hip shows a well healing incision. SLR without difficulty, hip flexion of 90 degrees, hip external rotation of 45 degrees. 5/5 motor strength bilaterally distally. Sensation intact distally. LFCN intact.  [de-identified] : Xray- AP pelvis and 2 views of the right hip shows a hip replacement in stable position, without sign of fracture or dislocation.  [de-identified] : The patient is doing very well 3 weeks after right anterior total hip replacement. The patient continue outpatient physical therapy. Celebrex was renewed. The patient will continue aspirin 325 mg twice per day for DVT prophylaxis for the next 3 weeks. Anterior hip precautions were reinforced. Overall the patient is very happy with their outcome so far. Followup in 3 weeks with repeat x-rays.

## 2021-04-27 ENCOUNTER — APPOINTMENT (OUTPATIENT)
Dept: ORTHOPEDIC SURGERY | Facility: CLINIC | Age: 66
End: 2021-04-27
Payer: MEDICARE

## 2021-04-27 VITALS
HEART RATE: 99 BPM | SYSTOLIC BLOOD PRESSURE: 143 MMHG | BODY MASS INDEX: 29.82 KG/M2 | HEIGHT: 73 IN | WEIGHT: 225 LBS | DIASTOLIC BLOOD PRESSURE: 90 MMHG

## 2021-04-27 DIAGNOSIS — M16.11 UNILATERAL PRIMARY OSTEOARTHRITIS, RIGHT HIP: ICD-10-CM

## 2021-04-27 DIAGNOSIS — Z96.649 PRESENCE OF UNSPECIFIED ARTIFICIAL HIP JOINT: ICD-10-CM

## 2021-04-27 DIAGNOSIS — Z47.1 AFTERCARE FOLLOWING JOINT REPLACEMENT SURGERY: ICD-10-CM

## 2021-04-27 PROCEDURE — 73502 X-RAY EXAM HIP UNI 2-3 VIEWS: CPT | Mod: RT

## 2021-04-27 PROCEDURE — 99024 POSTOP FOLLOW-UP VISIT: CPT

## 2021-04-27 NOTE — HISTORY OF PRESENT ILLNESS
[Doing Well] : is doing well [Excellent Pain Control] : has excellent pain control [No Sign of Infection] : is showing no signs of infection [Erythema] : not erythematous [Discharge] : absent of discharge [Dehiscence] : not dehisced [de-identified] : S/P Right robotic_assisted anterior total hip replacement with Efren. DOS 3/12/21\par . \par  [de-identified] : The patient is a 66 year old male 6 weeks s/p right anterior THR. He is ambulating and transferring well without assistive device. He reports continuing outpatient physical therapy with good improvement of strength. He has concluded aspirin for DVT prophylaxis. He has returned to daily activities of life without significant pain or discomfort. Overall, he is very happy with the results of the surgery thus far. [de-identified] : Exam of the right hip shows a well healed incision. Excellent strength with SLR, hip flexion of 90 degrees, hip external rotation of 55 degrees, hip internal rotation of 15 degrees. 5/5 motor strength bilaterally distally. Sensation intact distally. LFCN intact.  [de-identified] : Xray- AP pelvis and 2 views of the right hip shows a hip replacement in stable position, without sign of fracture or dislocation.  [de-identified] : The patient is doing very well 6 weeks following right anterior total hip replacement. Anterior hip precautions were reviewed and recommended to be followed for another 6 weeks. The patient will continue outpatient physical therapy and gradually transition to a home exercise program over the next 6 weeks. The patient may stop taking full strength aspirin at this point. Dental prophylaxis was reviewed. The patient is doing very well so far and overall very happy with their progress. Follow up in 6 weeks.\par \par

## 2021-04-27 NOTE — ADDENDUM
[FreeTextEntry1] : This note was authored by Eric Gonzalez working as a medical scribe for Dr. Shahab Candelaria. The note was reviewed, edited, and revised by Dr. Shahab Candelaria whom is in agreement with the exam findings, imaging findings, and treatment plan. 04/27/2021.

## 2021-06-08 ENCOUNTER — APPOINTMENT (OUTPATIENT)
Dept: ORTHOPEDIC SURGERY | Facility: CLINIC | Age: 66
End: 2021-06-08
Payer: MEDICARE

## 2021-06-08 VITALS
DIASTOLIC BLOOD PRESSURE: 91 MMHG | WEIGHT: 225 LBS | BODY MASS INDEX: 29.82 KG/M2 | SYSTOLIC BLOOD PRESSURE: 125 MMHG | HEIGHT: 73 IN | HEART RATE: 74 BPM

## 2021-06-08 PROCEDURE — 73502 X-RAY EXAM HIP UNI 2-3 VIEWS: CPT | Mod: RT

## 2021-06-08 PROCEDURE — 99024 POSTOP FOLLOW-UP VISIT: CPT

## 2021-06-08 NOTE — PHYSICAL EXAM
[de-identified] : The patient appears well nourished  and in no apparent distress.  The patient is alert and oriented to person, place, and time.   Affect and mood appear normal. The head is normocephalic and atraumatic.  The eyes reveal normal sclera and extra ocular muscles are intact. The tongue is midline with no apparent lesions.  Skin shows normal turgor with no evidence of eczema or psoriasis.  No respiratory distress noted.  Sensation grossly intact.  [de-identified] : Exam of the right hip shows a well healed incision. 5/5 motor strength bilaterally distally. Sensation intact distally. Patient can perform a straight leg raise with excellent strength.  Flexion 110 degrees, external rotation 50 degrees, internal rotation 25 degrees.  No pain with hip range of motion. [de-identified] :  X-ray: AP of the pelvis and 2 views of the right hip demonstrate a right total hip arthroplasty in stable position, with no evidence of fracture, loosening, or dislocation.

## 2021-06-08 NOTE — REASON FOR VISIT
[Follow-Up Visit] : a follow-up visit for [Other: ____] : [unfilled] [FreeTextEntry2] : S/P Right robotic_assisted anterior total hip replacement with Efren. DOS 3/12/21

## 2021-06-08 NOTE — HISTORY OF PRESENT ILLNESS
[de-identified] : Patient presents today for evaluation of a right anterior total hip arthroplasty.  He is just over 3 months.  He is doing well.  He reports that he is increasing his activities.  He reports that he is starting to taper the outpatient physical therapy.  He continues his home exercise program.  He reports of increasing strength.  No postoperative complications are reported.  No dislocations.  No other related complaints.

## 2021-06-08 NOTE — DISCUSSION/SUMMARY
[de-identified] : The patient is a 66 year old male doing very well 3 months following anterior total hip replacement.  The patient will continue outpatient physical therapy and gradually transition to a home exercise program. Dental prophylaxis was reviewed. The patient is doing very well so far and overall very happy with their progress. Follow up in 1 year.

## 2021-06-08 NOTE — ADDENDUM
[FreeTextEntry1] : This note was authored by Olga Vera working as a medical scribe for Dr. Shahab Candelaria. The note was reviewed, edited, and revised by Dr. Shahab Candelaria whom is in agreement with the exam findings, imaging findings, and treatment plan. 06/08/2021

## 2021-06-08 NOTE — DISCUSSION/SUMMARY
[de-identified] : The patient is a 66 year old male doing very well 3 months following anterior total hip replacement.  The patient will continue outpatient physical therapy and gradually transition to a home exercise program. Dental prophylaxis was reviewed. The patient is doing very well so far and overall very happy with their progress. Follow up in 1 year.

## 2021-06-08 NOTE — HISTORY OF PRESENT ILLNESS
[de-identified] : Patient presents today for evaluation of a right anterior total hip arthroplasty.  He is just over 3 months.  He is doing well.  He reports that he is increasing his activities.  He reports that he is starting to taper the outpatient physical therapy.  He continues his home exercise program.  He reports of increasing strength.  No postoperative complications are reported.  No dislocations.  No other related complaints.

## 2021-06-08 NOTE — PHYSICAL EXAM
[de-identified] : The patient appears well nourished  and in no apparent distress.  The patient is alert and oriented to person, place, and time.   Affect and mood appear normal. The head is normocephalic and atraumatic.  The eyes reveal normal sclera and extra ocular muscles are intact. The tongue is midline with no apparent lesions.  Skin shows normal turgor with no evidence of eczema or psoriasis.  No respiratory distress noted.  Sensation grossly intact.  [de-identified] : Exam of the right hip shows a well healed incision. 5/5 motor strength bilaterally distally. Sensation intact distally. Patient can perform a straight leg raise with excellent strength.  Flexion 110 degrees, external rotation 50 degrees, internal rotation 25 degrees.  No pain with hip range of motion. [de-identified] :  X-ray: AP of the pelvis and 2 views of the right hip demonstrate a right total hip arthroplasty in stable position, with no evidence of fracture, loosening, or dislocation.

## 2021-06-10 ENCOUNTER — FORM ENCOUNTER (OUTPATIENT)
Age: 66
End: 2021-06-10

## 2022-01-07 ENCOUNTER — OUTPATIENT (OUTPATIENT)
Dept: OUTPATIENT SERVICES | Facility: HOSPITAL | Age: 67
LOS: 1 days | End: 2022-01-07
Payer: MEDICARE

## 2022-01-07 DIAGNOSIS — Z20.828 CONTACT WITH AND (SUSPECTED) EXPOSURE TO OTHER VIRAL COMMUNICABLE DISEASES: ICD-10-CM

## 2022-01-07 DIAGNOSIS — Z98.890 OTHER SPECIFIED POSTPROCEDURAL STATES: Chronic | ICD-10-CM

## 2022-01-07 LAB — SARS-COV-2 RNA SPEC QL NAA+PROBE: SIGNIFICANT CHANGE UP

## 2022-01-07 PROCEDURE — U0005: CPT

## 2022-01-07 PROCEDURE — C9803: CPT

## 2022-01-07 PROCEDURE — U0003: CPT

## 2022-01-08 DIAGNOSIS — Z20.828 CONTACT WITH AND (SUSPECTED) EXPOSURE TO OTHER VIRAL COMMUNICABLE DISEASES: ICD-10-CM

## 2023-07-27 RX ORDER — AMOXICILLIN 500 MG/1
500 TABLET, FILM COATED ORAL
Qty: 4 | Refills: 2 | Status: ACTIVE | COMMUNITY
Start: 2023-07-27 | End: 1900-01-01

## 2023-08-03 ENCOUNTER — INPATIENT (INPATIENT)
Facility: HOSPITAL | Age: 68
LOS: 2 days | Discharge: HOME | DRG: 871 | End: 2023-08-06
Attending: FAMILY MEDICINE | Admitting: INTERNAL MEDICINE
Payer: MEDICARE

## 2023-08-03 VITALS — WEIGHT: 199.96 LBS | HEIGHT: 73 IN

## 2023-08-03 DIAGNOSIS — Z98.890 OTHER SPECIFIED POSTPROCEDURAL STATES: Chronic | ICD-10-CM

## 2023-08-03 DIAGNOSIS — B60.00 BABESIOSIS, UNSPECIFIED: ICD-10-CM

## 2023-08-03 LAB
ALBUMIN SERPL ELPH-MCNC: 3.3 G/DL — SIGNIFICANT CHANGE UP (ref 3.3–5)
ALP SERPL-CCNC: 237 U/L — HIGH (ref 40–120)
ALT FLD-CCNC: 119 U/L — HIGH (ref 12–78)
ANION GAP SERPL CALC-SCNC: 7 MMOL/L — SIGNIFICANT CHANGE UP (ref 5–17)
ANISOCYTOSIS BLD QL: SLIGHT — SIGNIFICANT CHANGE UP
APPEARANCE UR: CLEAR — SIGNIFICANT CHANGE UP
APTT BLD: 27.6 SEC — SIGNIFICANT CHANGE UP (ref 24.5–35.6)
AST SERPL-CCNC: 91 U/L — HIGH (ref 15–37)
BACTERIA # UR AUTO: ABNORMAL
BASOPHILS # BLD AUTO: 0 K/UL — SIGNIFICANT CHANGE UP (ref 0–0.2)
BASOPHILS # BLD AUTO: 0.01 K/UL — SIGNIFICANT CHANGE UP (ref 0–0.2)
BASOPHILS NFR BLD AUTO: 0 % — SIGNIFICANT CHANGE UP (ref 0–2)
BASOPHILS NFR BLD AUTO: 0.3 % — SIGNIFICANT CHANGE UP (ref 0–2)
BILIRUB SERPL-MCNC: 2.2 MG/DL — HIGH (ref 0.2–1.2)
BILIRUB UR-MCNC: NEGATIVE — SIGNIFICANT CHANGE UP
BUN SERPL-MCNC: 24 MG/DL — HIGH (ref 7–23)
CALCIUM SERPL-MCNC: 8.9 MG/DL — SIGNIFICANT CHANGE UP (ref 8.5–10.1)
CHLORIDE SERPL-SCNC: 102 MMOL/L — SIGNIFICANT CHANGE UP (ref 96–108)
CO2 SERPL-SCNC: 23 MMOL/L — SIGNIFICANT CHANGE UP (ref 22–31)
COLOR SPEC: ABNORMAL
CREAT SERPL-MCNC: 1.55 MG/DL — HIGH (ref 0.5–1.3)
DIFF PNL FLD: ABNORMAL
EGFR: 49 ML/MIN/1.73M2 — LOW
EOSINOPHIL # BLD AUTO: 0 K/UL — SIGNIFICANT CHANGE UP (ref 0–0.5)
EOSINOPHIL # BLD AUTO: 0.01 K/UL — SIGNIFICANT CHANGE UP (ref 0–0.5)
EOSINOPHIL NFR BLD AUTO: 0 % — SIGNIFICANT CHANGE UP (ref 0–6)
EOSINOPHIL NFR BLD AUTO: 0.3 % — SIGNIFICANT CHANGE UP (ref 0–6)
EPI CELLS # UR: SIGNIFICANT CHANGE UP
ERYTHROCYTE [SEDIMENTATION RATE] IN BLOOD: 48 MM/HR — HIGH (ref 0–20)
GLUCOSE SERPL-MCNC: 108 MG/DL — HIGH (ref 70–99)
GLUCOSE UR QL: NEGATIVE — SIGNIFICANT CHANGE UP
HCT VFR BLD CALC: 31.9 % — LOW (ref 39–50)
HCT VFR BLD CALC: 33.3 % — LOW (ref 39–50)
HGB BLD-MCNC: 11.1 G/DL — LOW (ref 13–17)
HGB BLD-MCNC: 11.8 G/DL — LOW (ref 13–17)
IMM GRANULOCYTES NFR BLD AUTO: 0.3 % — SIGNIFICANT CHANGE UP (ref 0–0.9)
INR BLD: 1.03 RATIO — SIGNIFICANT CHANGE UP (ref 0.85–1.18)
KETONES UR-MCNC: ABNORMAL
LACTATE SERPL-SCNC: 0.7 MMOL/L — SIGNIFICANT CHANGE UP (ref 0.7–2)
LEUKOCYTE ESTERASE UR-ACNC: ABNORMAL
LYMPHOCYTES # BLD AUTO: 0.54 K/UL — LOW (ref 1–3.3)
LYMPHOCYTES # BLD AUTO: 0.6 K/UL — LOW (ref 1–3.3)
LYMPHOCYTES # BLD AUTO: 15 % — SIGNIFICANT CHANGE UP (ref 13–44)
LYMPHOCYTES # BLD AUTO: 15.7 % — SIGNIFICANT CHANGE UP (ref 13–44)
MANUAL SMEAR VERIFICATION: SIGNIFICANT CHANGE UP
MCHC RBC-ENTMCNC: 31.6 PG — SIGNIFICANT CHANGE UP (ref 27–34)
MCHC RBC-ENTMCNC: 32.2 PG — SIGNIFICANT CHANGE UP (ref 27–34)
MCHC RBC-ENTMCNC: 34.8 GM/DL — SIGNIFICANT CHANGE UP (ref 32–36)
MCHC RBC-ENTMCNC: 35.4 GM/DL — SIGNIFICANT CHANGE UP (ref 32–36)
MCV RBC AUTO: 90.7 FL — SIGNIFICANT CHANGE UP (ref 80–100)
MCV RBC AUTO: 90.9 FL — SIGNIFICANT CHANGE UP (ref 80–100)
MONOCYTES # BLD AUTO: 0.5 K/UL — SIGNIFICANT CHANGE UP (ref 0–0.9)
MONOCYTES # BLD AUTO: 0.5 K/UL — SIGNIFICANT CHANGE UP (ref 0–0.9)
MONOCYTES NFR BLD AUTO: 13.1 % — SIGNIFICANT CHANGE UP (ref 2–14)
MONOCYTES NFR BLD AUTO: 14 % — SIGNIFICANT CHANGE UP (ref 2–14)
NEUTROPHILS # BLD AUTO: 2.46 K/UL — SIGNIFICANT CHANGE UP (ref 1.8–7.4)
NEUTROPHILS # BLD AUTO: 2.69 K/UL — SIGNIFICANT CHANGE UP (ref 1.8–7.4)
NEUTROPHILS NFR BLD AUTO: 69 % — SIGNIFICANT CHANGE UP (ref 43–77)
NEUTROPHILS NFR BLD AUTO: 70.3 % — SIGNIFICANT CHANGE UP (ref 43–77)
NITRITE UR-MCNC: NEGATIVE — SIGNIFICANT CHANGE UP
NRBC # BLD: 0 /100 — SIGNIFICANT CHANGE UP (ref 0–0)
NRBC # BLD: SIGNIFICANT CHANGE UP /100 WBCS (ref 0–0)
PARASITE BLD: SIGNIFICANT CHANGE UP
PH UR: 5 — SIGNIFICANT CHANGE UP (ref 5–8)
PLAT MORPH BLD: NORMAL — SIGNIFICANT CHANGE UP
PLATELET # BLD AUTO: 77 K/UL — LOW (ref 150–400)
PLATELET # BLD AUTO: 80 K/UL — LOW (ref 150–400)
PLATELET COUNT - ESTIMATE: ABNORMAL
POTASSIUM SERPL-MCNC: 3.9 MMOL/L — SIGNIFICANT CHANGE UP (ref 3.5–5.3)
POTASSIUM SERPL-SCNC: 3.9 MMOL/L — SIGNIFICANT CHANGE UP (ref 3.5–5.3)
PROT SERPL-MCNC: 7.4 GM/DL — SIGNIFICANT CHANGE UP (ref 6–8.3)
PROT UR-MCNC: 100
PROTHROM AB SERPL-ACNC: 11.6 SEC — SIGNIFICANT CHANGE UP (ref 9.5–13)
RAPID RVP RESULT: SIGNIFICANT CHANGE UP
RBC # BLD: 3.51 M/UL — LOW (ref 4.2–5.8)
RBC # BLD: 3.67 M/UL — LOW (ref 4.2–5.8)
RBC # FLD: 14.1 % — SIGNIFICANT CHANGE UP (ref 10.3–14.5)
RBC # FLD: 14.2 % — SIGNIFICANT CHANGE UP (ref 10.3–14.5)
RBC BLD AUTO: ABNORMAL
RBC CASTS # UR COMP ASSIST: ABNORMAL /HPF (ref 0–4)
SARS-COV-2 RNA SPEC QL NAA+PROBE: SIGNIFICANT CHANGE UP
SODIUM SERPL-SCNC: 132 MMOL/L — LOW (ref 135–145)
SP GR SPEC: 1.01 — SIGNIFICANT CHANGE UP (ref 1.01–1.02)
UROBILINOGEN FLD QL: 4
VARIANT LYMPHS # BLD: 2 % — SIGNIFICANT CHANGE UP (ref 0–6)
WBC # BLD: 3.57 K/UL — LOW (ref 3.8–10.5)
WBC # BLD: 3.82 K/UL — SIGNIFICANT CHANGE UP (ref 3.8–10.5)
WBC # FLD AUTO: 3.57 K/UL — LOW (ref 3.8–10.5)
WBC # FLD AUTO: 3.82 K/UL — SIGNIFICANT CHANGE UP (ref 3.8–10.5)
WBC UR QL: SIGNIFICANT CHANGE UP /HPF (ref 0–5)

## 2023-08-03 PROCEDURE — 85025 COMPLETE CBC W/AUTO DIFF WBC: CPT

## 2023-08-03 PROCEDURE — 97116 GAIT TRAINING THERAPY: CPT | Mod: GP

## 2023-08-03 PROCEDURE — 83735 ASSAY OF MAGNESIUM: CPT

## 2023-08-03 PROCEDURE — 85045 AUTOMATED RETICULOCYTE COUNT: CPT

## 2023-08-03 PROCEDURE — 80076 HEPATIC FUNCTION PANEL: CPT

## 2023-08-03 PROCEDURE — 87798 DETECT AGENT NOS DNA AMP: CPT

## 2023-08-03 PROCEDURE — 99285 EMERGENCY DEPT VISIT HI MDM: CPT

## 2023-08-03 PROCEDURE — 82728 ASSAY OF FERRITIN: CPT

## 2023-08-03 PROCEDURE — 85027 COMPLETE CBC AUTOMATED: CPT

## 2023-08-03 PROCEDURE — 93005 ELECTROCARDIOGRAM TRACING: CPT

## 2023-08-03 PROCEDURE — 74176 CT ABD & PELVIS W/O CONTRAST: CPT

## 2023-08-03 PROCEDURE — 84484 ASSAY OF TROPONIN QUANT: CPT

## 2023-08-03 PROCEDURE — 80074 ACUTE HEPATITIS PANEL: CPT

## 2023-08-03 PROCEDURE — 82140 ASSAY OF AMMONIA: CPT

## 2023-08-03 PROCEDURE — 80053 COMPREHEN METABOLIC PANEL: CPT

## 2023-08-03 PROCEDURE — 99497 ADVNCD CARE PLAN 30 MIN: CPT | Mod: 25

## 2023-08-03 PROCEDURE — 82150 ASSAY OF AMYLASE: CPT

## 2023-08-03 PROCEDURE — 84100 ASSAY OF PHOSPHORUS: CPT

## 2023-08-03 PROCEDURE — 85730 THROMBOPLASTIN TIME PARTIAL: CPT

## 2023-08-03 PROCEDURE — 84443 ASSAY THYROID STIM HORMONE: CPT

## 2023-08-03 PROCEDURE — 86803 HEPATITIS C AB TEST: CPT

## 2023-08-03 PROCEDURE — 87484 EHRLICHA CHAFFEENSIS AMP PRB: CPT

## 2023-08-03 PROCEDURE — 82272 OCCULT BLD FECES 1-3 TESTS: CPT

## 2023-08-03 PROCEDURE — 71046 X-RAY EXAM CHEST 2 VIEWS: CPT | Mod: 26

## 2023-08-03 PROCEDURE — 87468 ANAPLSMA PHGCYTOPHLM AMP PRB: CPT

## 2023-08-03 PROCEDURE — 36415 COLL VENOUS BLD VENIPUNCTURE: CPT

## 2023-08-03 PROCEDURE — 82550 ASSAY OF CK (CPK): CPT

## 2023-08-03 PROCEDURE — 82607 VITAMIN B-12: CPT

## 2023-08-03 PROCEDURE — 71250 CT THORAX DX C-: CPT

## 2023-08-03 PROCEDURE — 82746 ASSAY OF FOLIC ACID SERUM: CPT

## 2023-08-03 PROCEDURE — 80061 LIPID PANEL: CPT

## 2023-08-03 PROCEDURE — 86140 C-REACTIVE PROTEIN: CPT

## 2023-08-03 PROCEDURE — 85610 PROTHROMBIN TIME: CPT

## 2023-08-03 PROCEDURE — 80048 BASIC METABOLIC PNL TOTAL CA: CPT

## 2023-08-03 PROCEDURE — 97161 PT EVAL LOW COMPLEX 20 MIN: CPT | Mod: GP

## 2023-08-03 PROCEDURE — 83880 ASSAY OF NATRIURETIC PEPTIDE: CPT

## 2023-08-03 PROCEDURE — 82306 VITAMIN D 25 HYDROXY: CPT

## 2023-08-03 PROCEDURE — 83010 ASSAY OF HAPTOGLOBIN QUANT: CPT

## 2023-08-03 PROCEDURE — 99223 1ST HOSP IP/OBS HIGH 75: CPT

## 2023-08-03 PROCEDURE — 82652 VIT D 1 25-DIHYDROXY: CPT

## 2023-08-03 PROCEDURE — 87207 SMEAR SPECIAL STAIN: CPT

## 2023-08-03 RX ORDER — SODIUM CHLORIDE 9 MG/ML
2000 INJECTION INTRAMUSCULAR; INTRAVENOUS; SUBCUTANEOUS ONCE
Refills: 0 | Status: COMPLETED | OUTPATIENT
Start: 2023-08-03 | End: 2023-08-03

## 2023-08-03 RX ORDER — ATOVAQUONE 750 MG/5ML
750 SUSPENSION ORAL DAILY
Refills: 0 | Status: DISCONTINUED | OUTPATIENT
Start: 2023-08-03 | End: 2023-08-06

## 2023-08-03 RX ORDER — ATORVASTATIN CALCIUM 80 MG/1
1 TABLET, FILM COATED ORAL
Qty: 0 | Refills: 0 | DISCHARGE

## 2023-08-03 RX ORDER — UBIDECARENONE 100 MG
1 CAPSULE ORAL
Qty: 0 | Refills: 0 | DISCHARGE

## 2023-08-03 RX ORDER — ACETAMINOPHEN 500 MG
650 TABLET ORAL EVERY 6 HOURS
Refills: 0 | Status: DISCONTINUED | OUTPATIENT
Start: 2023-08-03 | End: 2023-08-06

## 2023-08-03 RX ORDER — SODIUM CHLORIDE 9 MG/ML
1000 INJECTION INTRAMUSCULAR; INTRAVENOUS; SUBCUTANEOUS ONCE
Refills: 0 | Status: COMPLETED | OUTPATIENT
Start: 2023-08-03 | End: 2023-08-03

## 2023-08-03 RX ORDER — LANOLIN ALCOHOL/MO/W.PET/CERES
3 CREAM (GRAM) TOPICAL AT BEDTIME
Refills: 0 | Status: DISCONTINUED | OUTPATIENT
Start: 2023-08-03 | End: 2023-08-06

## 2023-08-03 RX ORDER — AZITHROMYCIN 500 MG/1
500 TABLET, FILM COATED ORAL EVERY 24 HOURS
Refills: 0 | Status: DISCONTINUED | OUTPATIENT
Start: 2023-08-04 | End: 2023-08-06

## 2023-08-03 RX ORDER — LACTOBACILLUS ACIDOPHILUS 100MM CELL
1 CAPSULE ORAL
Refills: 0 | Status: DISCONTINUED | OUTPATIENT
Start: 2023-08-03 | End: 2023-08-06

## 2023-08-03 RX ORDER — ATOVAQUONE 750 MG/5ML
750 SUSPENSION ORAL ONCE
Refills: 0 | Status: COMPLETED | OUTPATIENT
Start: 2023-08-03 | End: 2023-08-03

## 2023-08-03 RX ORDER — LISINOPRIL 2.5 MG/1
1 TABLET ORAL
Qty: 0 | Refills: 0 | DISCHARGE

## 2023-08-03 RX ORDER — ONDANSETRON 8 MG/1
4 TABLET, FILM COATED ORAL EVERY 8 HOURS
Refills: 0 | Status: DISCONTINUED | OUTPATIENT
Start: 2023-08-03 | End: 2023-08-06

## 2023-08-03 RX ORDER — OMEGA-3 ACID ETHYL ESTERS 1 G
0 CAPSULE ORAL
Qty: 0 | Refills: 0 | DISCHARGE

## 2023-08-03 RX ORDER — AZITHROMYCIN 500 MG/1
500 TABLET, FILM COATED ORAL ONCE
Refills: 0 | Status: COMPLETED | OUTPATIENT
Start: 2023-08-03 | End: 2023-08-03

## 2023-08-03 RX ORDER — SODIUM CHLORIDE 9 MG/ML
1000 INJECTION INTRAMUSCULAR; INTRAVENOUS; SUBCUTANEOUS
Refills: 0 | Status: DISCONTINUED | OUTPATIENT
Start: 2023-08-03 | End: 2023-08-04

## 2023-08-03 RX ADMIN — Medication 650 MILLIGRAM(S): at 22:12

## 2023-08-03 RX ADMIN — Medication 110 MILLIGRAM(S): at 19:01

## 2023-08-03 RX ADMIN — AZITHROMYCIN 255 MILLIGRAM(S): 500 TABLET, FILM COATED ORAL at 20:34

## 2023-08-03 RX ADMIN — SODIUM CHLORIDE 1000 MILLILITER(S): 9 INJECTION INTRAMUSCULAR; INTRAVENOUS; SUBCUTANEOUS at 19:01

## 2023-08-03 RX ADMIN — SODIUM CHLORIDE 100 MILLILITER(S): 9 INJECTION INTRAMUSCULAR; INTRAVENOUS; SUBCUTANEOUS at 22:14

## 2023-08-03 RX ADMIN — ATOVAQUONE 750 MILLIGRAM(S): 750 SUSPENSION ORAL at 20:34

## 2023-08-03 NOTE — ED PROVIDER NOTE - CLINICAL SUMMARY MEDICAL DECISION MAKING FREE TEXT BOX
Patient with vague fevers x10 days with reported low platelets and elevated liver enzymes concerning for possible tickborne illness, versus other fever of unknown origin.  Plan for labs and admission.

## 2023-08-03 NOTE — H&P ADULT - ASSESSMENT
Pt is a 67 yo male with a pmh/o HLD, diet controlled HTN, SCC s/p Mohs sx recently, who presents to ED accompanied by wife due to few weeks of easy bruising, dark urine, malaise, fevers, who was evaluated by PMD Dr. Puga who had outpatient labs and imaging performed which returned abnormal and pt called to go to ED. Pt admitted due to:       #Babesia infection  #Sepsis  Admit to med/surg  ID consult  avoid NSAIDS in setting of anemia/thrombocytopenia and easy bruising with recent hematuria (has since resolved)  Tylenol prn fever/pain  Cooling measures prn fever  Fall/aspiration precautions  f/u lyme, ehrlichia, anaplasma, mono, tick borne disease panels  f/u blood and urine cultures  COVID/RVP negative  c/w abx- azithro IV, mepron po  IVF for gentle hydration- wife reports pt with poor oral intake due to malaise  OOB and ambulate with assistance  SCD for DVT ppx, chemical AC held due to plts 70 from 109 yesterday, pt bleed risk    #Pancytopenia  likely reactive to parasitic infxn  monitor on cbc, coags studies  hold chemical AC    #Renal Insufficiency  baseline Cr 1.4, today 1.55 (was 1.48 yesterday)  gentle hydration ordered  avoid nephrotoxic meds- taken of ACE as outpt  renally dose meds  monitor renal fxn on serum chemistry    #Transaminitis  #gall bladder adenomymytosis  LFTs increased, per wife has been increasing lately  Noted to have increased LFTs in EMR since 2021  Imaging showing gallbladder finding as above  GI consulted- Dr. Mcneill per family request  Pt with strong family h/o cancer (pancreatic, colon, breast)  Hepatitis panel ordered    #Abnormal urinalysis  likely contaminated sample  f/u culture  denies urinary sx    #HTN/HLD  hold statin  recently taken of ACE due to ЕЛЕНА  DASH/TLC diet    #Weakness  PT consult    #SCC of right ear  Wound care consult  no s/s cellulitis/infection on exam

## 2023-08-03 NOTE — ED PROVIDER NOTE - PHYSICAL EXAMINATION
GEN - NAD; well appearing; A+O x3  HEAD - NC/AT    EYES - EOMI, no conjunctival pallor, no scleral icterus  ENT -   healing right ear wound with granulation tissue no drainage   NECK - Neck supple  PULM - CTA b/l,  symmetric breath sounds  COR -  RRR, S1 S2, no murmurs  ABD - ND, NT, soft, no guarding, no rebound, no masses    BACK - no CVA tenderness, nontender spine     EXTREMS -no edema, no deformity, warm and well perfused   SKIN - no rash or bruising      NEUROLOGIC - alert, sensation nl, motor 5/5 RUE/LUE/RLE/LLE

## 2023-08-03 NOTE — ED PROVIDER NOTE - OBJECTIVE STATEMENT
68-year-old male presents with fever x10 days.  Patient saw primary who did labs and noted that his platelets were low and liver enzymes were elevated.  Patient had sono of right upper quadrant which was negative as well as sono of kidneys.  Patient also had reported negative chest x-ray.  Patient reports no history of tick exposures.  Patient had recent right ear Mohs surgery which was checked by a dermatologist and reportedly no evidence of infection.

## 2023-08-03 NOTE — H&P ADULT - MUSCULOSKELETAL
ROM intact/no joint swelling/no calf tenderness/normal gait/strength 5/5 bilateral upper extremities/strength 5/5 bilateral lower extremities/no chest wall tenderness negative

## 2023-08-03 NOTE — ED ADULT TRIAGE NOTE - CHIEF COMPLAINT QUOTE
Pt arrives to ED complaining of intermittent fevers for about ten days. Pt was told recently he has low platelets. Hx squamous cell. Recent surgery to remove skin cancer on 7/25.

## 2023-08-03 NOTE — H&P ADULT - HISTORY OF PRESENT ILLNESS
Pt is a 69 yo male with a pmh/o HLD, diet controlled HTN, SCC s/p Mohs sx recently, who presents to ED accompanied by wife due to few weeks of easy bruising, dark urine, malaise, fevers, who was evaluated by PMD Dr. Puga who had outpatient labs and imaging performed. Out pt labs noted to have plts of 102, Alk phos 231/ALT 90/AST 67, lipase 76, h/h 11.2/31.7. Imaging showed gallbladder adenomymytosis. Pt wife states she suspected UTI and pt has been on augmentin until this AM when U/A returned normal from PMD office. PMD called pt to go to ED for further evaluation and admission due to transaminitis, thrombocytopenia. Of note, pt also with recent ЕЛЕНА due to ACE use, baseline cr 1.4 per wife, today 1.8.     ED course: Pt found to have h/h 11.1/31.9 which was c/w outpt labs. Plts now 77 from 102 on yesterdays labs. 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 Temp persistently 102 despite tx with tylenol. HR 98 on arrival. Pt meeting sepsis criteria on admission, treated with doxy prior to babesia finding. mepron and azithromycin started. Blood and urine cultures collected. Lactate <2. Recieved 1 L bolus in ED, addition 2L ordered for completion of 30cc/kg.

## 2023-08-03 NOTE — H&P ADULT - NEUROLOGICAL
negative cranial nerves II-XII intact/sensation intact/responds to pain/responds to verbal commands/cranial nerves intact/no spontaneous movement

## 2023-08-03 NOTE — H&P ADULT - TIME BILLING
A minimum of 76 min was spent completing this admission not including time spent discussing advanced care planning or discussing goals of care with a minimum of 36 min spent face to face with patient while in ED unit on admission, counseling patient on current acute on chronic conditions and discussing plan and coordination of care including diagnostic laboratory tests which were ordered and reasoning behind each test, discussion of consultants ordered to evaluate patient in am and reasoning behind each specific need such as ID and GI , and also predicted possible discharge planning discussed.

## 2023-08-04 LAB
ADD ON TEST-SPECIMEN IN LAB: SIGNIFICANT CHANGE UP
ALBUMIN SERPL ELPH-MCNC: 2.4 G/DL — LOW (ref 3.3–5)
ALP SERPL-CCNC: 194 U/L — HIGH (ref 40–120)
ALT FLD-CCNC: 93 U/L — HIGH (ref 12–78)
ANION GAP SERPL CALC-SCNC: 7 MMOL/L — SIGNIFICANT CHANGE UP (ref 5–17)
APTT BLD: 27.4 SEC — SIGNIFICANT CHANGE UP (ref 24.5–35.6)
AST SERPL-CCNC: 77 U/L — HIGH (ref 15–37)
B BURGDOR C6 AB SER-ACNC: ABNORMAL
B BURGDOR IGG+IGM SER QL IB: SIGNIFICANT CHANGE UP
B BURGDOR IGG+IGM SER-ACNC: 0.94 INDEX — HIGH (ref 0.01–0.89)
BABESIA MICROTI PCR, BLD RESULT: DETECTED
BASOPHILS # BLD AUTO: 0.01 K/UL — SIGNIFICANT CHANGE UP (ref 0–0.2)
BASOPHILS NFR BLD AUTO: 0.4 % — SIGNIFICANT CHANGE UP (ref 0–2)
BILIRUB SERPL-MCNC: 2.2 MG/DL — HIGH (ref 0.2–1.2)
BUN SERPL-MCNC: 18 MG/DL — SIGNIFICANT CHANGE UP (ref 7–23)
CALCIUM SERPL-MCNC: 8.2 MG/DL — LOW (ref 8.5–10.1)
CHLORIDE SERPL-SCNC: 111 MMOL/L — HIGH (ref 96–108)
CO2 SERPL-SCNC: 22 MMOL/L — SIGNIFICANT CHANGE UP (ref 22–31)
CREAT SERPL-MCNC: 1.27 MG/DL — SIGNIFICANT CHANGE UP (ref 0.5–1.3)
CULTURE RESULTS: NO GROWTH — SIGNIFICANT CHANGE UP
CULTURE RESULTS: SIGNIFICANT CHANGE UP
CULTURE RESULTS: SIGNIFICANT CHANGE UP
EGFR: 62 ML/MIN/1.73M2 — SIGNIFICANT CHANGE UP
EOSINOPHIL # BLD AUTO: 0 K/UL — SIGNIFICANT CHANGE UP (ref 0–0.5)
EOSINOPHIL NFR BLD AUTO: 0 % — SIGNIFICANT CHANGE UP (ref 0–6)
GLUCOSE SERPL-MCNC: 104 MG/DL — HIGH (ref 70–99)
HCT VFR BLD CALC: 26.5 % — LOW (ref 39–50)
HCV AB S/CO SERPL IA: 0.09 S/CO — SIGNIFICANT CHANGE UP (ref 0–0.99)
HCV AB SERPL-IMP: SIGNIFICANT CHANGE UP
HGB BLD-MCNC: 9.3 G/DL — LOW (ref 13–17)
IMM GRANULOCYTES NFR BLD AUTO: 0.4 % — SIGNIFICANT CHANGE UP (ref 0–0.9)
INR BLD: 1.05 RATIO — SIGNIFICANT CHANGE UP (ref 0.85–1.18)
LYME IGG AB: 0.13 INDEX — SIGNIFICANT CHANGE UP (ref 0.01–0.9)
LYME IGG INTERP: NEGATIVE — SIGNIFICANT CHANGE UP
LYME IGM AB: <0.01 INDEX — SIGNIFICANT CHANGE UP (ref 0.01–0.9)
LYME IGM INTERP: NEGATIVE — SIGNIFICANT CHANGE UP
LYMPHOCYTES # BLD AUTO: 0.51 K/UL — LOW (ref 1–3.3)
LYMPHOCYTES # BLD AUTO: 21.8 % — SIGNIFICANT CHANGE UP (ref 13–44)
MCHC RBC-ENTMCNC: 32.1 PG — SIGNIFICANT CHANGE UP (ref 27–34)
MCHC RBC-ENTMCNC: 35.1 GM/DL — SIGNIFICANT CHANGE UP (ref 32–36)
MCV RBC AUTO: 91.4 FL — SIGNIFICANT CHANGE UP (ref 80–100)
MONOCYTES # BLD AUTO: 0.32 K/UL — SIGNIFICANT CHANGE UP (ref 0–0.9)
MONOCYTES NFR BLD AUTO: 13.7 % — SIGNIFICANT CHANGE UP (ref 2–14)
NEUTROPHILS # BLD AUTO: 1.49 K/UL — LOW (ref 1.8–7.4)
NEUTROPHILS NFR BLD AUTO: 63.7 % — SIGNIFICANT CHANGE UP (ref 43–77)
NT-PROBNP SERPL-SCNC: 202 PG/ML — HIGH (ref 0–125)
PLATELET # BLD AUTO: 69 K/UL — LOW (ref 150–400)
POTASSIUM SERPL-MCNC: 4.2 MMOL/L — SIGNIFICANT CHANGE UP (ref 3.5–5.3)
POTASSIUM SERPL-SCNC: 4.2 MMOL/L — SIGNIFICANT CHANGE UP (ref 3.5–5.3)
PROT SERPL-MCNC: 5.8 GM/DL — LOW (ref 6–8.3)
PROTHROM AB SERPL-ACNC: 11.8 SEC — SIGNIFICANT CHANGE UP (ref 9.5–13)
RBC # BLD: 2.9 M/UL — LOW (ref 4.2–5.8)
RBC # FLD: 14.6 % — HIGH (ref 10.3–14.5)
SODIUM SERPL-SCNC: 140 MMOL/L — SIGNIFICANT CHANGE UP (ref 135–145)
SPECIMEN SOURCE: SIGNIFICANT CHANGE UP
WBC # BLD: 2.34 K/UL — LOW (ref 3.8–10.5)
WBC # FLD AUTO: 2.34 K/UL — LOW (ref 3.8–10.5)

## 2023-08-04 PROCEDURE — 71250 CT THORAX DX C-: CPT | Mod: 26

## 2023-08-04 PROCEDURE — 93010 ELECTROCARDIOGRAM REPORT: CPT

## 2023-08-04 PROCEDURE — 74176 CT ABD & PELVIS W/O CONTRAST: CPT | Mod: 26

## 2023-08-04 PROCEDURE — 99232 SBSQ HOSP IP/OBS MODERATE 35: CPT

## 2023-08-04 RX ORDER — TAMSULOSIN HYDROCHLORIDE 0.4 MG/1
0.4 CAPSULE ORAL AT BEDTIME
Refills: 0 | Status: DISCONTINUED | OUTPATIENT
Start: 2023-08-04 | End: 2023-08-06

## 2023-08-04 RX ORDER — LACTOBACILLUS ACIDOPHILUS 100MM CELL
1 CAPSULE ORAL
Qty: 30 | Refills: 0
Start: 2023-08-04 | End: 2023-09-02

## 2023-08-04 RX ORDER — ATOVAQUONE 750 MG/5ML
5 SUSPENSION ORAL
Qty: 40 | Refills: 0
Start: 2023-08-04 | End: 2023-08-11

## 2023-08-04 RX ORDER — SODIUM CHLORIDE 9 MG/ML
1000 INJECTION, SOLUTION INTRAVENOUS
Refills: 0 | Status: DISCONTINUED | OUTPATIENT
Start: 2023-08-04 | End: 2023-08-05

## 2023-08-04 RX ORDER — AZITHROMYCIN 500 MG/1
1 TABLET, FILM COATED ORAL
Qty: 8 | Refills: 0
Start: 2023-08-04 | End: 2023-08-11

## 2023-08-04 RX ADMIN — SODIUM CHLORIDE 85 MILLILITER(S): 9 INJECTION, SOLUTION INTRAVENOUS at 17:40

## 2023-08-04 RX ADMIN — SODIUM CHLORIDE 2000 MILLILITER(S): 9 INJECTION INTRAMUSCULAR; INTRAVENOUS; SUBCUTANEOUS at 02:19

## 2023-08-04 RX ADMIN — Medication 1 TABLET(S): at 21:25

## 2023-08-04 RX ADMIN — TAMSULOSIN HYDROCHLORIDE 0.4 MILLIGRAM(S): 0.4 CAPSULE ORAL at 21:24

## 2023-08-04 RX ADMIN — AZITHROMYCIN 255 MILLIGRAM(S): 500 TABLET, FILM COATED ORAL at 21:24

## 2023-08-04 RX ADMIN — ATOVAQUONE 750 MILLIGRAM(S): 750 SUSPENSION ORAL at 10:05

## 2023-08-04 RX ADMIN — Medication 1 TABLET(S): at 10:05

## 2023-08-04 RX ADMIN — SODIUM CHLORIDE 100 MILLILITER(S): 9 INJECTION INTRAMUSCULAR; INTRAVENOUS; SUBCUTANEOUS at 06:05

## 2023-08-04 NOTE — DIETITIAN INITIAL EVALUATION ADULT - ADD RECOMMEND
1) C/w DASH/ TLC diet  2) Premier protein shake BID to optimize nutritional needs (provides 160 kcal, 30 g protein/ shake)  3) Obtain vitamin D 25OH level to assess nutriture  4) Please obtain weekly weights  5) Consider adding thiamine 100 mg daily 2/2 poor PO intake/ malnutrition   6) MVI w/ minerals daily to ensure 100% RDA met  7) Encourage protein-rich foods, maximize food preferences  8) Monitor bowel movements, if no BM for >3 days, consider implementing bowel regimen.  9) Confirm goals of care regarding nutrition support  RD will continue to monitor PO intake, labs, hydration, and wt prn.

## 2023-08-04 NOTE — PHYSICAL THERAPY INITIAL EVALUATION ADULT - PERTINENT HX OF CURRENT PROBLEM, REHAB EVAL
69 yo male with a pmh/o HLD, diet controlled HTN, SCC s/p Mohs sx recently, who presents to ED accompanied by wife due to few weeks of easy bruising, dark urine, malaise, fevers. admission due to transaminitis, thrombocytopenia.

## 2023-08-04 NOTE — PHYSICAL THERAPY INITIAL EVALUATION ADULT - GENERAL OBSERVATIONS, REHAB EVAL
Pt seen for 30min PT Eval. Pt rec'd semi supine in bed in NAD. pt indep with all functional mobility without AD, no skilled PT needs.  Pt left in bathroom RN aware.

## 2023-08-04 NOTE — CONSULT NOTE ADULT - ASSESSMENT
69 y/o male with h/o HLD, diet controlled HTN, gallbladder adenomyomatosis, SCC s/p Mohs sx recently was admitted on 8/3 due to few weeks of easy bruising, dark urine, malaise, fevers, who was evaluated by PMD Dr. Puga who had outpatient labs and imaging performed. Pt wife states she suspected UTI and pt has been on augmentin. PMD called pt to go to ED for further evaluation and admission due to transaminitis, thrombocytopenia. 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    1. Babesiosis. Acute hemolytic anemia. Thrombocytopenia. ARF.   -labs reviewed --> parasite noted in RBC's  -start atovaquone 750 mg PO q12h and azithromycin 500 mg IV qd  -reason for abx use and side effects reviewed with patient; monitor BMP   -blood for parasite was note detected  -no evidence of Lyme disease  -old chart reviewed to assess prior cultures  -hydration  -monitor temps  -f/u CBC  -supportive care  2. Other issues:   -care per medicine

## 2023-08-04 NOTE — DIETITIAN INITIAL EVALUATION ADULT - ETIOLOGY
r/t decreased ability to meet increased nutrient needs 2/2 babesiosis, nausea, bouts of constipation/ diarrhea, poor appetite

## 2023-08-04 NOTE — PHYSICAL THERAPY INITIAL EVALUATION ADULT - ADDITIONAL COMMENTS
Pt reports being indep without AD for amb and indep with all ADLs prior. Pt states he lives a very active life style, mountain bikes, plays tennis

## 2023-08-04 NOTE — PATIENT PROFILE ADULT - FALL HARM RISK - UNIVERSAL INTERVENTIONS
Bed in lowest position, wheels locked, appropriate side rails in place/Call bell, personal items and telephone in reach/Instruct patient to call for assistance before getting out of bed or chair/Non-slip footwear when patient is out of bed/[place] to call system/Physically safe environment - no spills, clutter or unnecessary equipment/Purposeful Proactive Rounding/Room/bathroom lighting operational, light cord in reach

## 2023-08-04 NOTE — CONSULT NOTE ADULT - SUBJECTIVE AND OBJECTIVE BOX
Patient is a 68y old  Male who presents with a chief complaint of Babesiosis complicated by sepsis, pancytopenia, transaminitis     HPI:  69 y/o male with h/o HLD, diet controlled HTN, gallbladder adenomyomatosis, SCC s/p Mohs sx recently was admitted on 8/3 due to few weeks of easy bruising, dark urine, malaise, fevers, who was evaluated by PMD Dr. Puga who had outpatient labs and imaging performed. Pt wife states she suspected UTI and pt has been on augmentin. PMD called pt to go to ED for further evaluation and admission due to transaminitis, thrombocytopenia. 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     PMH: as above  PSH: as above  Meds: per reconciliation sheet, noted below  MEDICATIONS  (STANDING):  atovaquone  Suspension 750 milliGRAM(s) Oral daily  azithromycin  IVPB 500 milliGRAM(s) IV Intermittent every 24 hours  lactobacillus acidophilus 1 Tablet(s) Oral two times a day with meals  sodium chloride 0.9%. 1000 milliLiter(s) (100 mL/Hr) IV Continuous <Continuous>    MEDICATIONS  (PRN):  acetaminophen     Tablet .. 650 milliGRAM(s) Oral every 6 hours PRN Temp greater or equal to 38C (100.4F), Mild Pain (1 - 3)  melatonin 3 milliGRAM(s) Oral at bedtime PRN Insomnia  ondansetron Injectable 4 milliGRAM(s) IV Push every 8 hours PRN Nausea and/or Vomiting    Allergies    No Known Allergies    Intolerances      Social: no smoking, no alcohol, no illegal drugs; no recent travel, no exposure to TB  FAMILY HISTORY:  FH: colon cancer (Mother)    FH: pancreatic cancer (Sibling)    FH: brain aneurysm (Father)    Family history of TIAs (Mother)    FH: heart disease (Sibling)    FH: breast cancer (Sibling)      no history of premature cardiovascular disease in first degree relatives    ROS: the patient denies fever, no chills, no HA, no seizures, no dizziness, no sore throat, no nasal congestion, no blurry vision, no CP, no palpitations, no SOB, no cough, no abdominal pain, no diarrhea, no N/V, no dysuria, no leg pain, no claudication, no rash, no joint aches, no rectal pain or bleeding, no night sweats  All other systems reviewed and are negative    Vital Signs Last 24 Hrs  T(C): 36.7 (04 Aug 2023 08:35), Max: 39.1 (03 Aug 2023 18:12)  T(F): 98.1 (04 Aug 2023 08:35), Max: 102.3 (03 Aug 2023 18:12)  HR: 68 (04 Aug 2023 08:35) (67 - 98)  BP: 123/76 (04 Aug 2023 08:35) (110/63 - 131/76)  BP(mean): 100 (03 Aug 2023 15:47) (100 - 100)  RR: 16 (04 Aug 2023 08:35) (16 - 18)  SpO2: 99% (04 Aug 2023 08:35) (97% - 100%)    Parameters below as of 04 Aug 2023 08:35  Patient On (Oxygen Delivery Method): room air      Daily Height in cm: 185.42 (03 Aug 2023 15:21)    Daily     PE:    Constitutional:  No acute distress  HEENT: NC/AT, EOMI, PERRLA, conjunctivae clear; ears and nose atraumatic; pharynx benign  Neck: supple; thyroid not palpable  Back: no tenderness  Respiratory: respiratory effort normal; clear to auscultation  Cardiovascular: S1S2 regular, no murmurs  Abdomen: soft, not tender, not distended, positive BS; no liver or spleen organomegaly  Genitourinary: no suprapubic tenderness  Lymphatic: no LN palpable  Musculoskeletal: no muscle tenderness, no joint swelling or tenderness  Extremities: no pedal edema  Neurological/ Psychiatric: AxOx3, judgement and insight normal; moving all extremities  Skin: no rashes; no palpable lesions    Labs: all available labs reviewed                        9.3    2.34  )-----------( 69       ( 04 Aug 2023 07:52 )             26.5     08-04    140  |  111<H>  |  18  ----------------------------<  104<H>  4.2   |  22  |  1.27    Ca    8.2<L>      04 Aug 2023 07:52    TPro  5.8<L>  /  Alb  2.4<L>  /  TBili  2.2<H>  /  DBili  x   /  AST  77<H>  /  ALT  93<H>  /  AlkPhos  194<H>  08-04     LIVER FUNCTIONS - ( 04 Aug 2023 07:52 )  Alb: 2.4 g/dL / Pro: 5.8 gm/dL / ALK PHOS: 194 U/L / ALT: 93 U/L / AST: 77 U/L / GGT: x           Urinalysis (08-03 @ 16:18)  Urine Appearance: Clear  Protein, Urine: 100    Urine Microscopic-Add On (NC) (08-03 @ 16:18)  White Blood Cell - Urine: 0-2 /HPF  Red Blood Cell - Urine: 3-5 /HPF      Culture Results:   NEGATIVE for Plasmodium antigens. Microscopy is performed for  confirmation.  This test does not detect the presence of Babesia species.  If Babesiosis is suspected, please order test for Babesia PCR: Babesia  microti PCR Bld  ************************************************************ (08-03 @ 18:18)  Culture Results:   NEGATIVE for Plasmodium antigens. Microscopy is performed for  confirmation.  This test does not detect the presence of Babesia species.  If Babesiosis is suspected, please order test for Babesia PCR: Babesia  microti PCR Bld  ************************************************************ (08-03 @ 16:18)        (08-03 @ 16:18)  NotDete      Radiology: all available radiological tests reviewed    Advanced directives addressed: full resuscitation

## 2023-08-04 NOTE — DIETITIAN INITIAL EVALUATION ADULT - ORAL INTAKE PTA/DIET HISTORY
Reports poor po intake x 11 days 2/2 poor appetite, nausea and bouts of constipation and diarrhea. Has been consuming 3 small meals/ day x 11 days; likely meeting <65% of ENN x 11 days. Normally consumes 3 meals/ day and snacks. Doesn't follow any diet restrictions or use any ONS at home. Pt lives w/ sister and pt does cooking/ shopping at home.

## 2023-08-04 NOTE — DIETITIAN INITIAL EVALUATION ADULT - OTHER INFO
69 y/o M with a PMHx of HLD, diet controlled HTN, SCC s/p Mohs sx recently, who presented to ED accompanied by wife due to few weeks of easy bruising, dark urine, malaise, fevers, who was evaluated by PMD Dr. Puga who had outpatient labs and imaging performed. Out pt labs noted to have plts of 102, Alk phos 231/ALT 90/AST 67, lipase 76, h/h 11.2/31.7. Imaging showed gallbladder adenomyomatosis. Pt wife states she suspected UTI and pt has been on augmentin until this AM when U/A returned normal from PMD office. PMD called pt to go to ED for further evaluation and admission due to transaminitis, thrombocytopenia. Of note, pt also with recent ЕЛЕНА due to ACE use. Admitted for babesiosis complicated by sepsis, pancytopenia, transaminitis.     Reports okay appetite/ intake since admission; waiting on breakfast at time of visit. Reports that his UBW is 205#, however has been losing weight over the last 11 days. RD obtained bedscale wt on 8/3 - 195#; weight loss of 10# (4.9%) x 11 days - clinsig and severe. No weight hx to review. NFPE reveals mild to moderate muscle/ fat wasting, pt meets criteria for PCM at this time. Encouraged high kcal/ high protein intake, pt receptive to trialing Premier Protein BID in effort to optimize PO intake. C/w DASH/ TLC diet. Please see additional recommendations below.

## 2023-08-04 NOTE — DIETITIAN INITIAL EVALUATION ADULT - PERTINENT LABORATORY DATA
08-04    140  |  111<H>  |  18  ----------------------------<  104<H>  4.2   |  22  |  1.27    Ca    8.2<L>      04 Aug 2023 07:52    TPro  5.8<L>  /  Alb  2.4<L>  /  TBili  2.2<H>  /  DBili  x   /  AST  77<H>  /  ALT  93<H>  /  AlkPhos  194<H>  08-04

## 2023-08-04 NOTE — DIETITIAN NUTRITION RISK NOTIFICATION - ADDITIONAL COMMENTS/DIETITIAN RECOMMENDATIONS
Acute pancreatitis  Pancreatitis  Alcohol withdrawal    BPH (benign prostatic hyperplasia)    Cerebral artery occlusion with cerebral infarction  Stroke  COPD (chronic obstructive pulmonary disease)    Crohn's disease    Diverticulosis of large intestine  Diverticulosis  ETOH abuse    History of pulmonary embolism    HLD (hyperlipidemia)    HTN (hypertension)    Hypertension    MI (myocardial infarction)    Pulmonary embolism
1) C/w DASH/ TLC diet  2) Premier protein shake BID to optimize nutritional needs (provides 160 kcal, 30 g protein/ shake)  3) Obtain vitamin D 25OH level to assess nutriture  4) Please obtain weekly weights  5) Consider adding thiamine 100 mg daily 2/2 poor PO intake/ malnutrition   6) MVI w/ minerals daily to ensure 100% RDA met  7) Encourage protein-rich foods, maximize food preferences  8) Monitor bowel movements, if no BM for >3 days, consider implementing bowel regimen.  9) Confirm goals of care regarding nutrition support  RD will continue to monitor PO intake, labs, hydration, and wt prn.

## 2023-08-04 NOTE — DIETITIAN INITIAL EVALUATION ADULT - PERTINENT MEDS FT
MEDICATIONS  (STANDING):  atovaquone  Suspension 750 milliGRAM(s) Oral daily  azithromycin  IVPB 500 milliGRAM(s) IV Intermittent every 24 hours  lactobacillus acidophilus 1 Tablet(s) Oral two times a day with meals  sodium chloride 0.9%. 1000 milliLiter(s) (100 mL/Hr) IV Continuous <Continuous>    MEDICATIONS  (PRN):  acetaminophen     Tablet .. 650 milliGRAM(s) Oral every 6 hours PRN Temp greater or equal to 38C (100.4F), Mild Pain (1 - 3)  melatonin 3 milliGRAM(s) Oral at bedtime PRN Insomnia  ondansetron Injectable 4 milliGRAM(s) IV Push every 8 hours PRN Nausea and/or Vomiting    Home Medications:  atorvastatin 10 mg oral tablet: 1 tab(s) orally once a day (03 Aug 2023 20:01)  Omega-3 oral capsule: orally once a day (03 Aug 2023 20:01)

## 2023-08-05 LAB
24R-OH-CALCIDIOL SERPL-MCNC: 40.2 NG/ML — SIGNIFICANT CHANGE UP (ref 30–80)
ALBUMIN SERPL ELPH-MCNC: 2.4 G/DL — LOW (ref 3.3–5)
ALP SERPL-CCNC: 189 U/L — HIGH (ref 40–120)
ALT FLD-CCNC: 85 U/L — HIGH (ref 12–78)
AMMONIA BLD-MCNC: <10 UMOL/L — LOW (ref 11–32)
AMYLASE P1 CFR SERPL: 44 U/L — SIGNIFICANT CHANGE UP (ref 25–115)
ANION GAP SERPL CALC-SCNC: 6 MMOL/L — SIGNIFICANT CHANGE UP (ref 5–17)
AST SERPL-CCNC: 69 U/L — HIGH (ref 15–37)
BILIRUB DIRECT SERPL-MCNC: 0.8 MG/DL — HIGH (ref 0–0.3)
BILIRUB INDIRECT FLD-MCNC: 0.7 MG/DL — SIGNIFICANT CHANGE UP (ref 0.2–1)
BILIRUB SERPL-MCNC: 1.5 MG/DL — HIGH (ref 0.2–1.2)
BUN SERPL-MCNC: 16 MG/DL — SIGNIFICANT CHANGE UP (ref 7–23)
CALCIUM SERPL-MCNC: 8.2 MG/DL — LOW (ref 8.5–10.1)
CHLORIDE SERPL-SCNC: 109 MMOL/L — HIGH (ref 96–108)
CHOLEST SERPL-MCNC: 109 MG/DL — SIGNIFICANT CHANGE UP
CK SERPL-CCNC: 176 U/L — SIGNIFICANT CHANGE UP (ref 26–308)
CO2 SERPL-SCNC: 21 MMOL/L — LOW (ref 22–31)
CREAT SERPL-MCNC: 1.19 MG/DL — SIGNIFICANT CHANGE UP (ref 0.5–1.3)
CRP SERPL-MCNC: 59 MG/L — HIGH
CULTURE RESULTS: SIGNIFICANT CHANGE UP
EGFR: 67 ML/MIN/1.73M2 — SIGNIFICANT CHANGE UP
FERRITIN SERPL-MCNC: 3864 NG/ML — HIGH (ref 30–400)
FOLATE SERPL-MCNC: 9 NG/ML — SIGNIFICANT CHANGE UP
GLUCOSE SERPL-MCNC: 111 MG/DL — HIGH (ref 70–99)
HAPTOGLOB SERPL-MCNC: <20 MG/DL — LOW (ref 34–200)
HAV IGM SER-ACNC: SIGNIFICANT CHANGE UP
HBV CORE IGM SER-ACNC: SIGNIFICANT CHANGE UP
HBV SURFACE AG SER-ACNC: SIGNIFICANT CHANGE UP
HCT VFR BLD CALC: 24.7 % — LOW (ref 39–50)
HCV AB S/CO SERPL IA: 0.11 S/CO — SIGNIFICANT CHANGE UP (ref 0–0.99)
HCV AB SERPL-IMP: SIGNIFICANT CHANGE UP
HDLC SERPL-MCNC: 12 MG/DL — LOW
HETEROPH AB TITR SER AGGL: NEGATIVE — SIGNIFICANT CHANGE UP
HGB BLD-MCNC: 8.7 G/DL — LOW (ref 13–17)
LIPID PNL WITH DIRECT LDL SERPL: 58 MG/DL — SIGNIFICANT CHANGE UP
MAGNESIUM SERPL-MCNC: 1.9 MG/DL — SIGNIFICANT CHANGE UP (ref 1.6–2.6)
MCHC RBC-ENTMCNC: 31.8 PG — SIGNIFICANT CHANGE UP (ref 27–34)
MCHC RBC-ENTMCNC: 35.2 GM/DL — SIGNIFICANT CHANGE UP (ref 32–36)
MCV RBC AUTO: 90.1 FL — SIGNIFICANT CHANGE UP (ref 80–100)
NON HDL CHOLESTEROL: 98 MG/DL — SIGNIFICANT CHANGE UP
NT-PROBNP SERPL-SCNC: 195 PG/ML — HIGH (ref 0–125)
OB PNL STL: NEGATIVE — SIGNIFICANT CHANGE UP
PHOSPHATE SERPL-MCNC: 2.8 MG/DL — SIGNIFICANT CHANGE UP (ref 2.5–4.5)
PLATELET # BLD AUTO: 75 K/UL — LOW (ref 150–400)
POTASSIUM SERPL-MCNC: 3.9 MMOL/L — SIGNIFICANT CHANGE UP (ref 3.5–5.3)
POTASSIUM SERPL-SCNC: 3.9 MMOL/L — SIGNIFICANT CHANGE UP (ref 3.5–5.3)
PROT SERPL-MCNC: 5.7 GM/DL — LOW (ref 6–8.3)
RBC # BLD: 2.74 M/UL — LOW (ref 4.2–5.8)
RBC # BLD: 2.74 M/UL — LOW (ref 4.2–5.8)
RBC # FLD: 14.4 % — SIGNIFICANT CHANGE UP (ref 10.3–14.5)
RETICS #: 27.4 K/UL — SIGNIFICANT CHANGE UP (ref 25–125)
RETICS/RBC NFR: 1 % — SIGNIFICANT CHANGE UP (ref 0.5–2.5)
SODIUM SERPL-SCNC: 136 MMOL/L — SIGNIFICANT CHANGE UP (ref 135–145)
SPECIMEN SOURCE: SIGNIFICANT CHANGE UP
TRIGL SERPL-MCNC: 243 MG/DL — HIGH
TROPONIN I, HIGH SENSITIVITY RESULT: 10.47 NG/L — SIGNIFICANT CHANGE UP
TSH SERPL-MCNC: 1.38 UU/ML — SIGNIFICANT CHANGE UP (ref 0.34–4.82)
VIT B12 SERPL-MCNC: 515 PG/ML — SIGNIFICANT CHANGE UP (ref 232–1245)
VIT D25+D1,25 OH+D1,25 PNL SERPL-MCNC: 76 PG/ML — SIGNIFICANT CHANGE UP (ref 19.9–79.3)
WBC # BLD: 2.37 K/UL — LOW (ref 3.8–10.5)
WBC # FLD AUTO: 2.37 K/UL — LOW (ref 3.8–10.5)

## 2023-08-05 PROCEDURE — 99232 SBSQ HOSP IP/OBS MODERATE 35: CPT

## 2023-08-05 RX ORDER — ACETAMINOPHEN 500 MG
1000 TABLET ORAL ONCE
Refills: 0 | Status: DISCONTINUED | OUTPATIENT
Start: 2023-08-05 | End: 2023-08-06

## 2023-08-05 RX ADMIN — Medication 1 TABLET(S): at 18:48

## 2023-08-05 RX ADMIN — ATOVAQUONE 750 MILLIGRAM(S): 750 SUSPENSION ORAL at 09:13

## 2023-08-05 RX ADMIN — Medication 1 TABLET(S): at 09:13

## 2023-08-05 RX ADMIN — SODIUM CHLORIDE 85 MILLILITER(S): 9 INJECTION, SOLUTION INTRAVENOUS at 09:13

## 2023-08-05 RX ADMIN — AZITHROMYCIN 255 MILLIGRAM(S): 500 TABLET, FILM COATED ORAL at 20:52

## 2023-08-05 RX ADMIN — TAMSULOSIN HYDROCHLORIDE 0.4 MILLIGRAM(S): 0.4 CAPSULE ORAL at 20:52

## 2023-08-05 NOTE — PROGRESS NOTE ADULT - NUTRITIONAL ASSESSMENT
This patient has been assessed with a concern for Malnutrition and has been determined to have a diagnosis/diagnoses of Severe protein-calorie malnutrition.    This patient is being managed with:   Diet DASH/TLC-  Sodium & Cholesterol Restricted  Entered: Aug  3 2023  7:37PM  
This patient has been assessed with a concern for Malnutrition and has been determined to have a diagnosis/diagnoses of Severe protein-calorie malnutrition.    This patient is being managed with:   Diet DASH/TLC-  Sodium & Cholesterol Restricted  Entered: Aug  3 2023  7:37PM

## 2023-08-06 ENCOUNTER — TRANSCRIPTION ENCOUNTER (OUTPATIENT)
Age: 68
End: 2023-08-06

## 2023-08-06 VITALS
RESPIRATION RATE: 18 BRPM | OXYGEN SATURATION: 99 % | SYSTOLIC BLOOD PRESSURE: 116 MMHG | TEMPERATURE: 99 F | DIASTOLIC BLOOD PRESSURE: 69 MMHG | HEART RATE: 76 BPM

## 2023-08-06 LAB
A PHAGOCYTOPH DNA BLD QL NAA+PROBE: NEGATIVE — SIGNIFICANT CHANGE UP
A PHAGOCYTOPH IGG TITR SER IF: SIGNIFICANT CHANGE UP TITER
ALBUMIN SERPL ELPH-MCNC: 2.6 G/DL — LOW (ref 3.3–5)
ALP SERPL-CCNC: 208 U/L — HIGH (ref 40–120)
ALT FLD-CCNC: 94 U/L — HIGH (ref 12–78)
ANION GAP SERPL CALC-SCNC: 6 MMOL/L — SIGNIFICANT CHANGE UP (ref 5–17)
AST SERPL-CCNC: 82 U/L — HIGH (ref 15–37)
B BURGDOR AB SER QL IA: NEGATIVE — SIGNIFICANT CHANGE UP
B MICROTI IGG TITR SER: ABNORMAL TITER
BASOPHILS # BLD AUTO: 0 K/UL — SIGNIFICANT CHANGE UP (ref 0–0.2)
BASOPHILS NFR BLD AUTO: 0 % — SIGNIFICANT CHANGE UP (ref 0–2)
BILIRUB SERPL-MCNC: 1.4 MG/DL — HIGH (ref 0.2–1.2)
BUN SERPL-MCNC: 18 MG/DL — SIGNIFICANT CHANGE UP (ref 7–23)
CALCIUM SERPL-MCNC: 8.4 MG/DL — LOW (ref 8.5–10.1)
CHLORIDE SERPL-SCNC: 106 MMOL/L — SIGNIFICANT CHANGE UP (ref 96–108)
CO2 SERPL-SCNC: 24 MMOL/L — SIGNIFICANT CHANGE UP (ref 22–31)
CREAT SERPL-MCNC: 1.3 MG/DL — SIGNIFICANT CHANGE UP (ref 0.5–1.3)
CRP SERPL-MCNC: 46 MG/L — HIGH
CULTURE RESULTS: SIGNIFICANT CHANGE UP
E CHAFFEENSIS DNA BLD QL NAA+PROBE: NEGATIVE — SIGNIFICANT CHANGE UP
E CHAFFEENSIS IGG TITR SER IF: SIGNIFICANT CHANGE UP TITER
E EWINGII DNA SPEC QL NAA+PROBE: NEGATIVE — SIGNIFICANT CHANGE UP
EGFR: 60 ML/MIN/1.73M2 — SIGNIFICANT CHANGE UP
EHRLICHIA DNA SPEC QL NAA+PROBE: NEGATIVE — SIGNIFICANT CHANGE UP
EOSINOPHIL # BLD AUTO: 0.02 K/UL — SIGNIFICANT CHANGE UP (ref 0–0.5)
EOSINOPHIL NFR BLD AUTO: 1 % — SIGNIFICANT CHANGE UP (ref 0–6)
GLUCOSE SERPL-MCNC: 109 MG/DL — HIGH (ref 70–99)
HCT VFR BLD CALC: 27.2 % — LOW (ref 39–50)
HGB BLD-MCNC: 9.3 G/DL — LOW (ref 13–17)
LYMPHOCYTES # BLD AUTO: 0.5 K/UL — LOW (ref 1–3.3)
LYMPHOCYTES # BLD AUTO: 21 % — SIGNIFICANT CHANGE UP (ref 13–44)
MANUAL SMEAR VERIFICATION: SIGNIFICANT CHANGE UP
MCHC RBC-ENTMCNC: 31.2 PG — SIGNIFICANT CHANGE UP (ref 27–34)
MCHC RBC-ENTMCNC: 34.2 GM/DL — SIGNIFICANT CHANGE UP (ref 32–36)
MCV RBC AUTO: 91.3 FL — SIGNIFICANT CHANGE UP (ref 80–100)
MONOCYTES # BLD AUTO: 0.24 K/UL — SIGNIFICANT CHANGE UP (ref 0–0.9)
MONOCYTES NFR BLD AUTO: 10 % — SIGNIFICANT CHANGE UP (ref 2–14)
NEUTROPHILS # BLD AUTO: 1.61 K/UL — LOW (ref 1.8–7.4)
NEUTROPHILS NFR BLD AUTO: 67 % — SIGNIFICANT CHANGE UP (ref 43–77)
NRBC # BLD: 0 /100 — SIGNIFICANT CHANGE UP (ref 0–0)
NRBC # BLD: SIGNIFICANT CHANGE UP /100 WBCS (ref 0–0)
PLAT MORPH BLD: NORMAL — SIGNIFICANT CHANGE UP
PLATELET # BLD AUTO: 99 K/UL — LOW (ref 150–400)
POTASSIUM SERPL-MCNC: 3.9 MMOL/L — SIGNIFICANT CHANGE UP (ref 3.5–5.3)
POTASSIUM SERPL-SCNC: 3.9 MMOL/L — SIGNIFICANT CHANGE UP (ref 3.5–5.3)
PROT SERPL-MCNC: 6.3 GM/DL — SIGNIFICANT CHANGE UP (ref 6–8.3)
RBC # BLD: 2.98 M/UL — LOW (ref 4.2–5.8)
RBC # FLD: 14.6 % — HIGH (ref 10.3–14.5)
RBC BLD AUTO: SIGNIFICANT CHANGE UP
SODIUM SERPL-SCNC: 136 MMOL/L — SIGNIFICANT CHANGE UP (ref 135–145)
SPECIMEN SOURCE: SIGNIFICANT CHANGE UP
VARIANT LYMPHS # BLD: 1 % — SIGNIFICANT CHANGE UP (ref 0–6)
WBC # BLD: 2.4 K/UL — LOW (ref 3.8–10.5)
WBC # FLD AUTO: 2.4 K/UL — LOW (ref 3.8–10.5)

## 2023-08-06 PROCEDURE — 99239 HOSP IP/OBS DSCHRG MGMT >30: CPT

## 2023-08-06 RX ORDER — ACETAMINOPHEN 500 MG
1 TABLET ORAL
Qty: 90 | Refills: 0
Start: 2023-08-06 | End: 2023-09-04

## 2023-08-06 RX ORDER — TAMSULOSIN HYDROCHLORIDE 0.4 MG/1
1 CAPSULE ORAL
Qty: 30 | Refills: 0
Start: 2023-08-06 | End: 2023-09-04

## 2023-08-06 RX ADMIN — AZITHROMYCIN 255 MILLIGRAM(S): 500 TABLET, FILM COATED ORAL at 09:51

## 2023-08-06 RX ADMIN — Medication 1 TABLET(S): at 09:51

## 2023-08-06 RX ADMIN — ATOVAQUONE 750 MILLIGRAM(S): 750 SUSPENSION ORAL at 09:51

## 2023-08-06 NOTE — DISCHARGE NOTE PROVIDER - DETAILS OF MALNUTRITION DIAGNOSIS/DIAGNOSES
This patient has been assessed with a concern for Malnutrition and was treated during this hospitalization for the following Nutrition diagnosis/diagnoses:     -  08/04/2023: Severe protein-calorie malnutrition

## 2023-08-06 NOTE — DISCHARGE NOTE PROVIDER - YES NO FOR MLM POSITIVE OR NEGATIVE COVID RESULT
Patient's (Body mass index is 42.38 kg/m².) indicates that they are obese (BMI >30) with health conditions that include none . Weight is unchanged. BMI is is above average; BMI management plan is completed. We discussed low calorie, low carb based diet program, portion control and increasing exercise.    ,

## 2023-08-06 NOTE — DISCHARGE NOTE PROVIDER - NSDCCPTREATMENT_GEN_ALL_CORE_FT
PRINCIPAL PROCEDURE  Procedure: CT chest wo con  Findings and Treatment: CHEST:  LUNGS AND LARGE AIRWAYS: Patent central airways. Minimal bibasilar   subsegmental atelectasis. The lungs are otherwise clear.  PLEURA: No pleural effusion.  VESSELS: Atherosclerotic changes. Coronary artery calcifications.  HEART: Heart size is normal. No pericardial effusion.  MEDIASTINUM AND RAMSES: No lymphadenopathy.  CHEST WALL AND LOWER NECK: Within normal limits.  ABDOMEN AND PELVIS:  LIVER: Within normal limits.  BILE DUCTS: Normal caliber.  GALLBLADDER: Contracted.  SPLEEN: Splenomegaly, measures 16.0 cm.  PANCREAS: Within normal limits.  ADRENALS: Within normal limits.  KIDNEYS/URETERS: Left renal sinus cysts. Subcentimeter hypodense focus in   the right kidney that is too small to characterize. No hydronephrosis.  BLADDER: Within normal limits.  REPRODUCTIVE ORGANS: Enlarged prostate.  BOWEL: Small hiatal hernia. Colonic diverticulosis. No evidence for acute   diverticulitis. No bowel obstruction. Appendix is normal.  PERITONEUM: Trace pelvic fluid.  VESSELS: Atherosclerotic changes.  RETROPERITONEUM/LYMPH NODES: No lymphadenopathy.  ABDOMINAL WALL: Within normal limits.  BONES: Right hip arthroplasty with associated streak artifact obscuring   portions of the pelvis. Degenerative changes.  IMPRESSION:  No acute findings within the chest, abdomen, or pelvis, within the   limitations of a noncontrast exam.  Splenomegaly.

## 2023-08-06 NOTE — PROGRESS NOTE ADULT - REASON FOR ADMISSION
Babesiosis complicated by sepsis, pancytopenia, transaminitis

## 2023-08-06 NOTE — PROGRESS NOTE ADULT - SUBJECTIVE AND OBJECTIVE BOX
Date of service: 08-05-23 @ 08:16    Lying in bed in NAD  Has low grade fever  Feels weak    ROS: denies dizziness, no HA, no SOB or cough, no abdominal pain, no diarrhea or constipation; no dysuria, no legs pain, no rashes    MEDICATIONS  (STANDING):  atovaquone  Suspension 750 milliGRAM(s) Oral daily  azithromycin  IVPB 500 milliGRAM(s) IV Intermittent every 24 hours  lactobacillus acidophilus 1 Tablet(s) Oral two times a day with meals  sodium chloride 0.45%. 1000 milliLiter(s) (85 mL/Hr) IV Continuous <Continuous>  tamsulosin 0.4 milliGRAM(s) Oral at bedtime    Vital Signs Last 24 Hrs  T(C): 37.6 (04 Aug 2023 20:44), Max: 37.6 (04 Aug 2023 15:40)  T(F): 99.6 (04 Aug 2023 20:44), Max: 99.7 (04 Aug 2023 15:40)  HR: 72 (04 Aug 2023 20:44) (68 - 76)  BP: 118/65 (04 Aug 2023 20:44) (118/65 - 126/72)  BP(mean): 78 (04 Aug 2023 20:44) (78 - 85)  RR: 17 (04 Aug 2023 20:44) (16 - 18)  SpO2: 100% (04 Aug 2023 20:44) (97% - 100%)    Parameters below as of 04 Aug 2023 20:44  Patient On (Oxygen Delivery Method): room air     Physical exam:    Constitutional:  No acute distress  HEENT: NC/AT, EOMI, PERRLA, conjunctivae clear; ears and nose atraumatic  Neck: supple; thyroid not palpable  Back: no tenderness  Respiratory: respiratory effort normal; clear to auscultation  Cardiovascular: S1S2 regular, no murmurs  Abdomen: soft, not tender, not distended, positive BS  Genitourinary: no suprapubic tenderness  Lymphatic: no LN palpable  Musculoskeletal: no muscle tenderness, no joint swelling or tenderness  Extremities: no pedal edema  Neurological/ Psychiatric: AxOx3, moving all extremities  Skin: no rashes; no palpable lesions    Labs: reviewed                        8.7    2.37  )-----------( 75       ( 05 Aug 2023 07:15 )             24.7     08-05    136  |  109<H>  |  16  ----------------------------<  111<H>  3.9   |  21<L>  |  1.19    Ca    8.2<L>      05 Aug 2023 07:15  Phos  2.8     08-05  Mg     1.9     08-05    TPro  5.7<L>  /  Alb  2.4<L>  /  TBili  1.5<H>  /  DBili  0.8<H>  /  AST  69<H>  /  ALT  85<H>  /  AlkPhos  189<H>  08-05                        9.3    2.34  )-----------( 69       ( 04 Aug 2023 07:52 )             26.5     08-04    140  |  111<H>  |  18  ----------------------------<  104<H>  4.2   |  22  |  1.27    Ca    8.2<L>      04 Aug 2023 07:52    TPro  5.8<L>  /  Alb  2.4<L>  /  TBili  2.2<H>  /  DBili  x   /  AST  77<H>  /  ALT  93<H>  /  AlkPhos  194<H>  08-04     LIVER FUNCTIONS - ( 04 Aug 2023 07:52 )  Alb: 2.4 g/dL / Pro: 5.8 gm/dL / ALK PHOS: 194 U/L / ALT: 93 U/L / AST: 77 U/L / GGT: x           Urinalysis (08-03 @ 16:18)  Urine Appearance: Clear  Protein, Urine: 100    Urine Microscopic-Add On (NC) (08-03 @ 16:18)  White Blood Cell - Urine: 0-2 /HPF  Red Blood Cell - Urine: 3-5 /HPF    (08-03 @ 16:18)  NotDetec    parasite level 1.4%    Radiology: all available radiological tests reviewed    < from: CT Abdomen and Pelvis No Cont (08.04.23 @ 19:33) >  No acute findings within the chest, abdomen, or pelvis, within the   limitations of a noncontrast exam.    < end of copied text >      Advanced directives addressed: full resuscitation
Date of service: 08-06-23 @ 08:33    Lying in bed in NAD  Has low grade fever  No chills    ROS: denies dizziness, no HA, no SOB or cough, no abdominal pain, no diarrhea or constipation; no dysuria, no legs pain, no rashes    MEDICATIONS  (STANDING):  atovaquone  Suspension 750 milliGRAM(s) Oral daily  azithromycin  IVPB 500 milliGRAM(s) IV Intermittent every 24 hours  lactobacillus acidophilus 1 Tablet(s) Oral two times a day with meals  tamsulosin 0.4 milliGRAM(s) Oral at bedtime    Vital Signs Last 24 Hrs  T(C): 36.8 (06 Aug 2023 08:04), Max: 37.4 (05 Aug 2023 21:37)  T(F): 98.3 (06 Aug 2023 08:04), Max: 99.4 (05 Aug 2023 21:37)  HR: 72 (06 Aug 2023 08:04) (72 - 78)  BP: 105/68 (06 Aug 2023 08:04) (105/68 - 134/78)  BP(mean): 75 (05 Aug 2023 21:37) (75 - 91)  RR: 18 (06 Aug 2023 08:04) (17 - 19)  SpO2: 96% (06 Aug 2023 08:04) (96% - 100%)    Parameters below as of 06 Aug 2023 08:04  Patient On (Oxygen Delivery Method): room air     Physical exam:    Constitutional:  No acute distress  HEENT: NC/AT, EOMI, PERRLA, conjunctivae clear; ears and nose atraumatic  Neck: supple; thyroid not palpable  Back: no tenderness  Respiratory: respiratory effort normal; clear to auscultation  Cardiovascular: S1S2 regular, no murmurs  Abdomen: soft, not tender, not distended, positive BS  Genitourinary: no suprapubic tenderness  Lymphatic: no LN palpable  Musculoskeletal: no muscle tenderness, no joint swelling or tenderness  Extremities: no pedal edema  Neurological/ Psychiatric: AxOx3, moving all extremities  Skin: no rashes; no palpable lesions    Labs: reviewed                        9.3    2.40  )-----------( 99       ( 06 Aug 2023 06:54 )             27.2     08-06    136  |  106  |  18  ----------------------------<  109<H>  3.9   |  24  |  1.30    Ca    8.4<L>      06 Aug 2023 06:54  Phos  2.8     08-05  Mg     1.9     08-05    TPro  6.3  /  Alb  2.6<L>  /  TBili  1.4<H>  /  DBili  x   /  AST  82<H>  /  ALT  94<H>  /  AlkPhos  208<H>  08-06    C-Reactive Protein, Serum: 59 mg/L (08-05-23 @ 07:15)  Ferritin: 3864 ng/mL (08-05-23 @ 07:15)                        8.7    2.37  )-----------( 75       ( 05 Aug 2023 07:15 )             24.7     08-05    136  |  109<H>  |  16  ----------------------------<  111<H>  3.9   |  21<L>  |  1.19    Ca    8.2<L>      05 Aug 2023 07:15  Phos  2.8     08-05  Mg     1.9     08-05    TPro  5.7<L>  /  Alb  2.4<L>  /  TBili  1.5<H>  /  DBili  0.8<H>  /  AST  69<H>  /  ALT  85<H>  /  AlkPhos  189<H>  08-05                        9.3    2.34  )-----------( 69       ( 04 Aug 2023 07:52 )             26.5     08-04    140  |  111<H>  |  18  ----------------------------<  104<H>  4.2   |  22  |  1.27    Ca    8.2<L>      04 Aug 2023 07:52    TPro  5.8<L>  /  Alb  2.4<L>  /  TBili  2.2<H>  /  DBili  x   /  AST  77<H>  /  ALT  93<H>  /  AlkPhos  194<H>  08-04     LIVER FUNCTIONS - ( 04 Aug 2023 07:52 )  Alb: 2.4 g/dL / Pro: 5.8 gm/dL / ALK PHOS: 194 U/L / ALT: 93 U/L / AST: 77 U/L / GGT: x           Urinalysis (08-03 @ 16:18)  Urine Appearance: Clear  Protein, Urine: 100    Urine Microscopic-Add On (NC) (08-03 @ 16:18)  White Blood Cell - Urine: 0-2 /HPF  Red Blood Cell - Urine: 3-5 /HPF    (08-03 @ 16:18)  NotDetec    parasite level 1.4%    Radiology: all available radiological tests reviewed    < from: CT Abdomen and Pelvis No Cont (08.04.23 @ 19:33) >  No acute findings within the chest, abdomen, or pelvis, within the   limitations of a noncontrast exam.    < end of copied text >      Advanced directives addressed: full resuscitation
Subjective:  Chief complain : weakness, fever, aches   HPI:      69 yo male with a pmh/o HLD, diet controlled HTN, SCC of right ear s/p Mohs sx recently, who admitted on 8/3/23 e due to few weeks of easy bruising, dark urine, malaise, fevers, who was evaluated by PMD Dr. Puga who had outpatient labs and imaging performed. Out pt labs noted to have plts of 102, Alk phos 231/ALT 90/AST 67, lipase 76, h/h 11.2/31.7. Imaging showed gallbladder adenomymytosis. Pt wife states she suspected UTI and pt has been on augmentin until this AM when U/A returned normal from PMD office. PMD called pt to go to ED for further evaluation and admission due to transaminitis, thrombocytopenia. Of note, pt also with recent ЕЛЕНА due to ACE use, baseline cr 1.4 per wife, today 1.8.    ED course: Pt found to have h/h 11.1/31.9 which was c/w outpt labs. Plts now 77 from 102 on yesterdays labs. 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 Temp persistently 102 despite tx with tylenol. HR 98 on arrival. Pt meeting sepsis criteria on admission, treated with doxy prior to babesia finding. mepron and azithromycin started. Blood and urine cultures collected. Lactate <2. Recieved 1 L bolus in ED, addition 2L ordered for completion of 30cc/kg.      8/4-    Patient seen and examined at bedside earlier today, feels better, reports frequent urinations, denies cp, dyspnea, abdominal pain, has some dry cough  8/5- no events, + RUE pain while getting IV azithromycin, denies cp, dyspnea, abdominal pain, plan discussed     Review of system- Rest of the review of system are negative except mentioned in HPI    Vital sings reviewed for last 24 h  T(C): 37.2 (08-05-23 @ 15:41), Max: 37.6 (08-04-23 @ 20:44)  T(F): 99 (08-05-23 @ 15:41), Max: 99.6 (08-04-23 @ 20:44)  HR: 78 (08-05-23 @ 15:41) (72 - 78)  BP: 134/78 (08-05-23 @ 15:41) (118/65 - 134/78)  RR: 19 (08-05-23 @ 15:41) (17 - 19)  SpO2: 100% (08-05-23 @ 15:41) (100% - 100%)      Physical exam:   General : NAD, appear to be of stated age , well groomed   NERVOUS SYSTEM:  Alert & Oriented X3, non- focal exam, Motor Strength 5/5 B/L upper and lower extremities; DTRs 2+ intact and symmetric  HEAD:  Atraumatic, Normocephalic  EYES: EOMI, PERRLA, conjunctiva and sclera clear  HEENT: Moist mucous membranes, Supple neck , No JVD  CHEST: Clear to auscultation bilaterally; No rales, no rhonchi, no wheezing  HEART: Regular rate and rhythm; No murmurs, no rubs or gallops  ABDOMEN: Soft, Non-tender, Non-distended; Bowel sounds present, no guarding , no peritoneal irritation   GENITOURINARY- Voiding, no suprapubic tenderness  EXTREMITIES:  2+ Peripheral Pulses, No clubbing, cyanosis,   edema  MUSCULOSKELETAL:- No muscle tenderness, Muscle tone normal, No joint tenderness, no Joint swelling,  Joint ROM –normal   SKIN-no rash, no lesion, Right ear bruising s/p surgery     Labs radiologic and other test : all reviewed and interpreted :                         8.7    2.37  )-----------( 75       ( 05 Aug 2023 07:15 )             24.7     08-05    136  |  109<H>  |  16  ----------------------------<  111<H>  3.9   |  21<L>  |  1.19    Ca    8.2<L>      05 Aug 2023 07:15  Phos  2.8     08-05  Mg     1.9     08-05    TPro  5.7<L>  /  Alb  2.4<L>  /  TBili  1.5<H>  /  DBili  0.8<H>  /  AST  69<H>  /  ALT  85<H>  /  AlkPhos  189<H>  08-05    CARDIAC MARKERS ( 05 Aug 2023 07:15 )  x     / x     / 176 U/L / x     / x          LIVER FUNCTIONS - ( 05 Aug 2023 07:15 )  Alb: 2.4 g/dL / Pro: 5.7 gm/dL / ALK PHOS: 189 U/L / ALT: 85 U/L / AST: 69 U/L / GGT: x                    CT Abdomen and Pelvis No Cont (08.04.23 @ 19:33) >  CHEST:  LUNGS AND LARGE AIRWAYS: Patent central airways. Minimal bibasilar   subsegmental atelectasis. The lungs are otherwise clear.  PLEURA: No pleural effusion.  VESSELS: Atherosclerotic changes. Coronary artery calcifications.  HEART: Heart size is normal. No pericardial effusion.  MEDIASTINUM AND RAMSES: No lymphadenopathy.  CHEST WALL AND LOWER NECK: Within normal limits.    ABDOMEN AND PELVIS:  LIVER: Within normal limits.  BILE DUCTS: Normal caliber.  GALLBLADDER: Contracted.  SPLEEN: Splenomegaly, measures 16.0 cm.  PANCREAS: Within normal limits.  ADRENALS: Within normal limits.  KIDNEYS/URETERS: Left renal sinus cysts. Subcentimeter hypodense focus in   the right kidney that is too small to characterize. No hydronephrosis.    BLADDER: Within normal limits.  REPRODUCTIVE ORGANS: Enlarged prostate.    BOWEL: Small hiatal hernia. Colonic diverticulosis. No evidence for acute   diverticulitis. No bowel obstruction. Appendix is normal.  PERITONEUM: Trace pelvic fluid.  VESSELS: Atherosclerotic changes.  RETROPERITONEUM/LYMPH NODES: No lymphadenopathy.  ABDOMINAL WALL: Within normal limits.  BONES: Right hip arthroplasty with associated streak artifact obscuring   portions of the pelvis. Degenerative changes.    IMPRESSION:  No acute findings within the chest, abdomen, or pelvis, within the   limitations of a noncontrast exam.    Splenomegaly.      RECENT CULTURES:  Parasitemia, Blood (08.05.23 @ 07:15)    Specimen Source: .Blood None   Culture Results:   Babesia species  by [facility] Stain  Parasitemia = < 1 %      Blood Parasites- Travel Associated . (08.03.23 @ 18:18)    Culture Results:   NEGATIVE for Plasmodium antigens. Microscopy is performed for  confirmation.  This test does not detect the presence of Babesia species.  If Babesiosis is suspected, please order test for Babesia PCR: Babesia  microti PCR Bld  ************************************************************  Babesia species  by [facility]  Parasitemia = 1.4 %    Culture Results:   NEGATIVE for Plasmodium antigens. Microscopy is performed for  confirmation.  This test does not detect the presence of Babesia species.  If Babesiosis is suspected, please order test for Babesia PCR: Babesia  microti PCR Bld  ************************************************************  Babesia species  by [facility] Stain  Parasitemia = 1.2 % (08.03.23 @ 16:18)          Cardiac testing : reviewed   EKG   < from: 12 Lead ECG (08.04.23 @ 08:03) >  Ventricular Rate 68 BPM    Atrial Rate 68 BPM    P-R Interval 184 ms    QRS Duration 102 ms    Q-T Interval 406 ms    QTC Calculation(Bazett) 431 ms    P Axis 55 degrees    R Axis -12 degrees    T Axis 35 degrees    Diagnosis Line Normal sinus rhythm  Incomplete right bundle branch block  Borderline ECG  No previous ECGs available    < end of copied text >    Procedures :   Devices:     Current medications:  acetaminophen     Tablet .. 650 milliGRAM(s) Oral every 6 hours PRN  atovaquone  Suspension 750 milliGRAM(s) Oral daily  azithromycin  IVPB 500 milliGRAM(s) IV Intermittent every 24 hours  lactobacillus acidophilus 1 Tablet(s) Oral two times a day with meals  melatonin 3 milliGRAM(s) Oral at bedtime PRN  ondansetron Injectable 4 milliGRAM(s) IV Push every 8 hours PRN  sodium chloride 0.45%. 1000 milliLiter(s) IV Continuous <Continuous>  tamsulosin 0.4 milliGRAM(s) Oral at bedtime            
Subjective:  Chief complain : weakness, fever, aches   HPI:      69 yo male with a pmh/o HLD, diet controlled HTN, SCC of right ear s/p Mohs sx recently, who admitted on 8/3/23 e due to few weeks of easy bruising, dark urine, malaise, fevers, who was evaluated by PMD Dr. Puga who had outpatient labs and imaging performed. Out pt labs noted to have plts of 102, Alk phos 231/ALT 90/AST 67, lipase 76, h/h 11.2/31.7. Imaging showed gallbladder adenomymytosis. Pt wife states she suspected UTI and pt has been on augmentin until this AM when U/A returned normal from PMD office. PMD called pt to go to ED for further evaluation and admission due to transaminitis, thrombocytopenia. Of note, pt also with recent ЕЛЕНА due to ACE use, baseline cr 1.4 per wife, today 1.8.    ED course: Pt found to have h/h 11.1/31.9 which was c/w outpt labs. Plts now 77 from 102 on yesterdays labs. 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 Temp persistently 102 despite tx with tylenol. HR 98 on arrival. Pt meeting sepsis criteria on admission, treated with doxy prior to babesia finding. mepron and azithromycin started. Blood and urine cultures collected. Lactate <2. Recieved 1 L bolus in ED, addition 2L ordered for completion of 30cc/kg.      8/4-    Patient seen and examined at bedside earlier today, feels better, reports frequent urinations, denies cp, dyspnea, abdominal pain, has some dry cough    Review of system- Rest of the review of system are negative except mentioned in HPI     Vital sings reviewed for last 24 h  T(C): 37.6 (08-04-23 @ 15:40), Max: 38.9 (08-03-23 @ 23:25)  HR: 76 (08-04-23 @ 15:40) (67 - 85)  BP: 126/72 (08-04-23 @ 15:40) (110/63 - 131/76)  RR: 18 (08-04-23 @ 15:40) (16 - 18)  SpO2: 97% (08-04-23 @ 15:40) (97% - 100%)    Physical exam:   General : NAD, appear to be of stated age , well groomed   NERVOUS SYSTEM:  Alert & Oriented X3, non- focal exam, Motor Strength 5/5 B/L upper and lower extremities; DTRs 2+ intact and symmetric  HEAD:  Atraumatic, Normocephalic  EYES: EOMI, PERRLA, conjunctiva and sclera clear  HEENT: Moist mucous membranes, Supple neck , No JVD  CHEST: Clear to auscultation bilaterally; No rales, no rhonchi, no wheezing  HEART: Regular rate and rhythm; No murmurs, no rubs or gallops  ABDOMEN: Soft, Non-tender, Non-distended; Bowel sounds present, no guarding , no peritoneal irritation   GENITOURINARY- Voiding, no suprapubic tenderness  EXTREMITIES:  2+ Peripheral Pulses, No clubbing, cyanosis,   edema  MUSCULOSKELETAL:- No muscle tenderness, Muscle tone normal, No joint tenderness, no Joint swelling,  Joint ROM –normal   SKIN-no rash, no lesion, Right ear bruising s/p surgery     Labs radiologic and other test : all reviewed and interpreted :                         9.3    2.34  )-----------( 69       ( 04 Aug 2023 07:52 )             26.5     08-04    140  |  111<H>  |  18  ----------------------------<  104<H>  4.2   |  22  |  1.27    Ca    8.2<L>      04 Aug 2023 07:52    TPro  5.8<L>  /  Alb  2.4<L>  /  TBili  2.2<H>  /  DBili  x   /  AST  77<H>  /  ALT  93<H>  /  AlkPhos  194<H>  08-04    PT/INR - ( 04 Aug 2023 07:52 )   PT: 11.8 sec;   INR: 1.05 ratio         PTT - ( 04 Aug 2023 07:52 )  PTT:27.4 sec     CT Abdomen and Pelvis No Cont (08.04.23 @ 19:33) >  CHEST:  LUNGS AND LARGE AIRWAYS: Patent central airways. Minimal bibasilar   subsegmental atelectasis. The lungs are otherwise clear.  PLEURA: No pleural effusion.  VESSELS: Atherosclerotic changes. Coronary artery calcifications.  HEART: Heart size is normal. No pericardial effusion.  MEDIASTINUM AND RAMSES: No lymphadenopathy.  CHEST WALL AND LOWER NECK: Within normal limits.    ABDOMEN AND PELVIS:  LIVER: Within normal limits.  BILE DUCTS: Normal caliber.  GALLBLADDER: Contracted.  SPLEEN: Splenomegaly, measures 16.0 cm.  PANCREAS: Within normal limits.  ADRENALS: Within normal limits.  KIDNEYS/URETERS: Left renal sinus cysts. Subcentimeter hypodense focus in   the right kidney that is too small to characterize. No hydronephrosis.    BLADDER: Within normal limits.  REPRODUCTIVE ORGANS: Enlarged prostate.    BOWEL: Small hiatal hernia. Colonic diverticulosis. No evidence for acute   diverticulitis. No bowel obstruction. Appendix is normal.  PERITONEUM: Trace pelvic fluid.  VESSELS: Atherosclerotic changes.  RETROPERITONEUM/LYMPH NODES: No lymphadenopathy.  ABDOMINAL WALL: Within normal limits.  BONES: Right hip arthroplasty with associated streak artifact obscuring   portions of the pelvis. Degenerative changes.    IMPRESSION:  No acute findings within the chest, abdomen, or pelvis, within the   limitations of a noncontrast exam.    Splenomegaly.      RECENT CULTURES:  Blood Parasites- Travel Associated . (08.03.23 @ 18:18)    Culture Results:   NEGATIVE for Plasmodium antigens. Microscopy is performed for  confirmation.  This test does not detect the presence of Babesia species.  If Babesiosis is suspected, please order test for Babesia PCR: Babesia  microti PCR Bld  ************************************************************  Babesia species  by [facility]  Parasitemia = 1.4 %    Culture Results:   NEGATIVE for Plasmodium antigens. Microscopy is performed for  confirmation.  This test does not detect the presence of Babesia species.  If Babesiosis is suspected, please order test for Babesia PCR: Babesia  microti PCR Bld  ************************************************************  Babesia species  by [facility] Stain  Parasitemia = 1.2 % (08.03.23 @ 16:18)          Cardiac testing : reviewed   EKG   < from: 12 Lead ECG (08.04.23 @ 08:03) >  Ventricular Rate 68 BPM    Atrial Rate 68 BPM    P-R Interval 184 ms    QRS Duration 102 ms    Q-T Interval 406 ms    QTC Calculation(Bazett) 431 ms    P Axis 55 degrees    R Axis -12 degrees    T Axis 35 degrees    Diagnosis Line Normal sinus rhythm  Incomplete right bundle branch block  Borderline ECG  No previous ECGs available    < end of copied text >    Procedures :   Devices:     Current medications:  acetaminophen     Tablet .. 650 milliGRAM(s) Oral every 6 hours PRN  atovaquone  Suspension 750 milliGRAM(s) Oral daily  azithromycin  IVPB 500 milliGRAM(s) IV Intermittent every 24 hours  lactobacillus acidophilus 1 Tablet(s) Oral two times a day with meals  melatonin 3 milliGRAM(s) Oral at bedtime PRN  ondansetron Injectable 4 milliGRAM(s) IV Push every 8 hours PRN  sodium chloride 0.45%. 1000 milliLiter(s) IV Continuous <Continuous>  tamsulosin 0.4 milliGRAM(s) Oral at bedtime

## 2023-08-06 NOTE — DISCHARGE NOTE PROVIDER - NSDCCPCAREPLAN_GEN_ALL_CORE_FT
PRINCIPAL DISCHARGE DIAGNOSIS  Diagnosis: Babesiasis  Assessment and Plan of Treatment: repeat CBC and parasite level in 5-7 days and reconsider duration of abx therapy as needed  complete 8 days off antinbiotics      SECONDARY DISCHARGE DIAGNOSES  Diagnosis: BPH associated with nocturia  Assessment and Plan of Treatment: started on flomax at bedtime, follow up with PCP within 1 week for further work-up and monitoring    Diagnosis: Transaminitis  Assessment and Plan of Treatment: repeat LFT s in 1 week, avoid alcohol    Diagnosis: Acute renal injury  Assessment and Plan of Treatment: repeat renal function within 1 week    Diagnosis: SCCA (squamous cell carcinoma) of skin  Assessment and Plan of Treatment: follow up with your primary oncologist for further care within 1 week

## 2023-08-06 NOTE — PROGRESS NOTE ADULT - ASSESSMENT
69 yo male with a pmh/o HLD, diet controlled HTN, SCC of right ear s/p Mohs sx recently, who admitted on 8/3/23 e due to few weeks of easy bruising, dark urine, malaise, fevers, who was evaluated by PMD Dr. Puga who had outpatient labs and imaging performed. Out pt labs noted to have plts of 102, Alk phos 231/ALT 90/AST 67, lipase 76, h/h 11.2/31.7. Imaging showed gallbladder adenomymytosis. Pt wife states she suspected UTI and pt has been on augmentin until this AM when U/A returned normal from PMD office.    Babesiosis with pancytopenia  Acute hemolytic anemia  Thrombocytopenia  Splenomegaly   -c/w  atovaquone 750 mg PO q12h and azithromycin 500 mg IV qd 10 days course   - blood cx , urine culture - pending  - parasitemia 1.4 % --> <1 %  - f/u lyme - past infection,   - f/u  ehrlichia, anaplasma, mono, tick borne disease panels    ЕЛЕНА , BPH , enlarge prostate on CT   - c/w IV fluids stop at 2330 tonight   - Creatinine Trend: 1.1 <--1.27<--, 1.55<--    Transaminitis with gall bladder adenomyomatosis  - LFTs increased, per wife has been increasing lately  - Noted to have increased LFTs in EMR since 2021  - Imaging showing gallbladder finding as above  - GI consulted- Dr. Mcneill per family request  - Pt with strong family h/o cancer (pancreatic, colon, breast)  - Hepatitis panel ordered     HTN - off meds     HLD - hold statin    Weakness - PT consult,  check B12, folate, vit D  wnl    SCC of right ear  - Wound care consult  - no s/s cellulitis/infection on exam    DVT proph - IPC    Dispo - IV azithromycin, d/c once cleared by ID    
69 y/o male with h/o HLD, diet controlled HTN, gallbladder adenomyomatosis, SCC s/p Mohs sx recently was admitted on 8/3 due to few weeks of easy bruising, dark urine, malaise, fevers, who was evaluated by PMD Dr. Puga who had outpatient labs and imaging performed. Pt wife states she suspected UTI and pt has been on augmentin. PMD called pt to go to ED for further evaluation and admission due to transaminitis, thrombocytopenia. 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    1. Babesiosis. Acute hemolytic anemia. Thrombocytopenia. ARF improving.   -labs reviewed --> parasite noted in RBC's  -on atovaquone 750 mg PO q12h and azithromycin 500 mg IV qd # 2  -reason for abx use and side effects reviewed with patient; monitor BMP   -blood for parasite was detected at 1.4%  -no evidence of Lyme disease  -continue abx therapy  -hydration  -monitor temps  -f/u CBC and parasite level  -supportive care  2. Other issues:   -care per medicine  
  69 yo male with a pmh/o HLD, diet controlled HTN, SCC of right ear s/p Mohs sx recently, who admitted on 8/3/23 e due to few weeks of easy bruising, dark urine, malaise, fevers, who was evaluated by PMD Dr. Puga who had outpatient labs and imaging performed. Out pt labs noted to have plts of 102, Alk phos 231/ALT 90/AST 67, lipase 76, h/h 11.2/31.7. Imaging showed gallbladder adenomymytosis. Pt wife states she suspected UTI and pt has been on augmentin until this AM when U/A returned normal from PMD office.    Babesiosis with pancytopenia  Acute hemolytic anemia  Thrombocytopenia  Splenomegaly   -c/w  atovaquone 750 mg PO q12h and azithromycin 500 mg IV qd 10 days course   - blood cx , urine culture - pending  - parasitemia 1.4 %   - f/u lyme - past infection,   - f/u  ehrlichia, anaplasma, mono, tick borne disease panels    ЕЛЕНА , BPH , enlarge prostate on CT   - c/w IV fluids  - Creatinine Trend: 1.27<--, 1.55<--    Transaminitis with gall bladder adenomyomatosis  - LFTs increased, per wife has been increasing lately  - Noted to have increased LFTs in EMR since 2021  - Imaging showing gallbladder finding as above  - GI consulted- Dr. Mcneill per family request  - Pt with strong family h/o cancer (pancreatic, colon, breast)  - Hepatitis panel ordered     HTN - off meds     HLD - hold statin    Weakness - PT consult,  check B12, folate, vit D     SCC of right ear  - Wound care consult  - no s/s cellulitis/infection on exam    DVT proph - IPC    
67 y/o male with h/o HLD, diet controlled HTN, gallbladder adenomyomatosis, SCC s/p Mohs sx recently was admitted on 8/3 due to few weeks of easy bruising, dark urine, malaise, fevers, who was evaluated by PMD Dr. Puga who had outpatient labs and imaging performed. Pt wife states she suspected UTI and pt has been on augmentin. PMD called pt to go to ED for further evaluation and admission due to transaminitis, thrombocytopenia. 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    1. Babesiosis. Acute hemolytic anemia. Thrombocytopenia. ARF improving.   -labs reviewed --> parasite noted in RBC's   -on atovaquone 750 mg PO q12h and azithromycin 500 mg IV qd # 3  -reason for abx use and side effects reviewed with patient; monitor BMP   -blood for parasite was detected at 1.4%  -no evidence of Lyme disease  -Hb is stable  -may change azithromycin to PO and continue abx therapy for 10 more days  -repeat CBC and parasite level in 5-7 days and reconsider duration of abx therapy as needed  -f/u with PMD as outpatient  -hydration  -monitor temps  -f/u CBC and parasite level  -supportive care  2. Other issues:   -care per medicine

## 2023-08-06 NOTE — PROVIDER CONTACT NOTE (CRITICAL VALUE NOTIFICATION) - ACTION/TREATMENT ORDERED:
no new orders at this time
no new orders at this time
MD aware. Pt discharged home on ABX and treatment regiment. No interventions or changes to discharge plan.

## 2023-08-06 NOTE — PROVIDER CONTACT NOTE (CRITICAL VALUE NOTIFICATION) - TEST AND RESULT REPORTED:
CBC- parasitic, need repeat
parasite blood: + babesia species
Babesia PCR- Detected
parasite seen on cbc
Lyme Titer Babesiosis 1:1.024

## 2023-08-06 NOTE — DISCHARGE NOTE PROVIDER - HOSPITAL COURSE
Chief complain : weakness, fever, aches   HPI:      67 yo male with a pmh/o HLD, diet controlled HTN, SCC of right ear s/p Mohs sx recently, who admitted on 8/3/23 e due to few weeks of easy bruising, dark urine, malaise, fevers, who was evaluated by PMD Dr. Puga who had outpatient labs and imaging performed. Out pt labs noted to have plts of 102, Alk phos 231/ALT 90/AST 67, lipase 76, h/h 11.2/31.7. Imaging showed gallbladder adenomymytosis. Pt wife states she suspected UTI and pt has been on augmentin until this AM when U/A returned normal from PMD office. PMD called pt to go to ED for further evaluation and admission due to transaminitis, thrombocytopenia. Of note, pt also with recent ЕЛЕНА due to ACE use, baseline cr 1.4 per wife, today 1.8.    ED course: Pt found to have h/h 11.1/31.9 which was c/w outpt labs. Plts now 77 from 102 on yesterdays labs. 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 Temp persistently 102 despite tx with tylenol. HR 98 on arrival. Pt meeting sepsis criteria on admission, treated with doxy prior to babesia finding. mepron and azithromycin started. Blood and urine cultures collected. Lactate <2. Recieved 1 L bolus in ED, addition 2L ordered for completion of 30cc/kg.    8/4-    Patient seen and examined at bedside earlier today, feels better, reports frequent urinations, denies cp, dyspnea, abdominal pain, has some dry cough  8/5- no events, + RUE pain while getting IV azithromycin, denies cp, dyspnea, abdominal pain, plan discussed   8/6 - no events, feels better, afebrile, tolerating more po intake, + gen weakness, aches improving  Review of system- Rest of the review of system are negative except mentioned in HPI  Vital sings reviewed for last 24 h  T(C): 36.8 (08-06-23 @ 08:04), Max: 37.4 (08-05-23 @ 21:37)  T(F): 98.3 (08-06-23 @ 08:04), Max: 99.4 (08-05-23 @ 21:37)  HR: 72 (08-06-23 @ 08:04) (72 - 78)  BP: 105/68 (08-06-23 @ 08:04) (105/68 - 134/78)  RR: 18 (08-06-23 @ 08:04) (17 - 19)  SpO2: 96% (08-06-23 @ 08:04) (96% - 100%)  Physical exam:   General : NAD, appear to be of stated age , well groomed   NERVOUS SYSTEM:  Alert & Oriented X3, non- focal exam, Motor Strength 5/5 B/L upper and lower extremities; DTRs 2+ intact and symmetric  HEAD:  Atraumatic, Normocephalic  EYES: EOMI, PERRLA, conjunctiva and sclera clear  HEENT: Moist mucous membranes, Supple neck , No JVD  CHEST: Clear to auscultation bilaterally; No rales, no rhonchi, no wheezing  HEART: Regular rate and rhythm; No murmurs, no rubs or gallops  ABDOMEN: Soft, Non-tender, Non-distended; Bowel sounds present, no guarding , no peritoneal irritation   GENITOURINARY- Voiding, no suprapubic tenderness  EXTREMITIES:  2+ Peripheral Pulses, No clubbing, cyanosis,   edema  MUSCULOSKELETAL:- No muscle tenderness, Muscle tone normal, No joint tenderness, no Joint swelling,  Joint ROM –normal   SKIN-no rash, no lesion, Right ear bruising s/p surgery   Labs radiologic and other test : all reviewed and interpreted :   Babesiosis with pancytopenia, Acute hemolytic anemia, Thrombocytopenia, Splenomegaly   -c/w  atovaquone 750 mg PO q12h and azithromycin 500 mg IV qd --> PO 10 days course   - blood cx , urine culture - neg   - parasitemia 1.4 % --> <1 %  - repeat CBC and parasite level in 5-7 days and reconsider duration of abx therapy as needed  repeat CBC and parasite level in 5-7 days and reconsider duration of abx therapy as needed  ЕЛЕНА , BPH , enlarge prostate on CT   - c/w IV fluids stop at 2330 tonight   - Creatinine Trend: 1.1 <--1.27<--, 1.55<--  Transaminitis with gall bladder adenomyomatosis  - LFTs increased, per wife has been increasing lately  - Noted to have increased LFTs in EMR since 2021  - Imaging showing gallbladder finding as above  - GI consulted- Dr. Mcneill per family request  - Pt with strong family h/o cancer (pancreatic, colon, breast)  - Hepatitis panel ordered   HTN - off meds   HLD - hold statin  Weakness - PT consult,  check B12, folate, vit D  wnl  SCC of right ear  - Wound care consult  - no s/s cellulitis/infection on examFinal diagnosis, treatment plan, and follow-up recommendations were discussed and explained to the patient.   The patient was given an opportunity to ask questions concerning the diagnosis and treatment plan.   The patient acknowledged understanding of the diagnosis, treatment, and follow-up recommendations.   The patient was advised to seek urgent care upon discharge if worsening symptoms develop prior to scheduled follow-up.   Time spent on discharge included time with the patient, and also coordinating discharge care as outlined below.  Discharge note faxed to PCP with my contact information to call me back   PCP Dr. Puga  Total time spent: 50 min

## 2023-08-06 NOTE — DISCHARGE NOTE PROVIDER - NSDCMRMEDTOKEN_GEN_ALL_CORE_FT
atorvastatin 10 mg oral tablet: 1 tab(s) orally once a day  atovaquone 750 mg/5 mL oral suspension: 5 milliliter(s) orally once a day  lactobacillus acidophilus oral capsule: 1 cap(s) orally once a day  Omega-3 oral capsule: orally once a day  tamsulosin 0.4 mg oral capsule: 1 cap(s) orally once a day (at bedtime)  Tylenol 8 Hour 650 mg oral tablet, extended release: 1 tab(s) orally every 8 hours as needed for  fever or pain  Zithromax 500 mg oral tablet: 1 tab(s) orally once a day

## 2023-08-06 NOTE — DISCHARGE NOTE NURSING/CASE MANAGEMENT/SOCIAL WORK - PATIENT PORTAL LINK FT
You can access the FollowMyHealth Patient Portal offered by [facility] by registering at the following website: [email or web address]. By joining [facility]’s FollowMyHealth portal, you will also be able to view your health information using other applications (apps) compatible with our system.

## 2023-08-07 LAB
A PHAGOCYTOPH DNA BLD QL NAA+PROBE: NEGATIVE — SIGNIFICANT CHANGE UP
E CHAFFEENSIS DNA BLD QL NAA+PROBE: NEGATIVE — SIGNIFICANT CHANGE UP
E EWINGII DNA SPEC QL NAA+PROBE: NEGATIVE — SIGNIFICANT CHANGE UP
EHRLICHIA DNA SPEC QL NAA+PROBE: NEGATIVE — SIGNIFICANT CHANGE UP
MANUAL DIF COMMENT BLD-IMP: SIGNIFICANT CHANGE UP

## 2023-08-09 LAB
CULTURE RESULTS: SIGNIFICANT CHANGE UP
CULTURE RESULTS: SIGNIFICANT CHANGE UP
SPECIMEN SOURCE: SIGNIFICANT CHANGE UP
SPECIMEN SOURCE: SIGNIFICANT CHANGE UP

## 2023-08-12 DIAGNOSIS — A41.9 SEPSIS, UNSPECIFIED ORGANISM: ICD-10-CM

## 2023-08-12 DIAGNOSIS — C44.222 SQUAMOUS CELL CARCINOMA OF SKIN OF RIGHT EAR AND EXTERNAL AURICULAR CANAL: ICD-10-CM

## 2023-08-12 DIAGNOSIS — K82.9 DISEASE OF GALLBLADDER, UNSPECIFIED: ICD-10-CM

## 2023-08-12 DIAGNOSIS — Z83.2 FAMILY HISTORY OF DISEASES OF THE BLOOD AND BLOOD-FORMING ORGANS AND CERTAIN DISORDERS INVOLVING THE IMMUNE MECHANISM: ICD-10-CM

## 2023-08-12 DIAGNOSIS — K57.30 DIVERTICULOSIS OF LARGE INTESTINE WITHOUT PERFORATION OR ABSCESS WITHOUT BLEEDING: ICD-10-CM

## 2023-08-12 DIAGNOSIS — R74.01 ELEVATION OF LEVELS OF LIVER TRANSAMINASE LEVELS: ICD-10-CM

## 2023-08-12 DIAGNOSIS — Z79.899 OTHER LONG TERM (CURRENT) DRUG THERAPY: ICD-10-CM

## 2023-08-12 DIAGNOSIS — B60.01 BABESIOSIS DUE TO BABESIA MICROTI: ICD-10-CM

## 2023-08-12 DIAGNOSIS — N40.0 BENIGN PROSTATIC HYPERPLASIA WITHOUT LOWER URINARY TRACT SYMPTOMS: ICD-10-CM

## 2023-08-12 DIAGNOSIS — Z82.49 FAMILY HISTORY OF ISCHEMIC HEART DISEASE AND OTHER DISEASES OF THE CIRCULATORY SYSTEM: ICD-10-CM

## 2023-08-12 DIAGNOSIS — E78.5 HYPERLIPIDEMIA, UNSPECIFIED: ICD-10-CM

## 2023-08-12 DIAGNOSIS — Z79.818 LONG TERM (CURRENT) USE OF OTHER AGENTS AFFECTING ESTROGEN RECEPTORS AND ESTROGEN LEVELS: ICD-10-CM

## 2023-08-12 DIAGNOSIS — Z20.822 CONTACT WITH AND (SUSPECTED) EXPOSURE TO COVID-19: ICD-10-CM

## 2023-08-12 DIAGNOSIS — Z80.0 FAMILY HISTORY OF MALIGNANT NEOPLASM OF DIGESTIVE ORGANS: ICD-10-CM

## 2023-08-12 DIAGNOSIS — K44.9 DIAPHRAGMATIC HERNIA WITHOUT OBSTRUCTION OR GANGRENE: ICD-10-CM

## 2023-08-12 DIAGNOSIS — D61.818 OTHER PANCYTOPENIA: ICD-10-CM

## 2023-08-12 DIAGNOSIS — E43 UNSPECIFIED SEVERE PROTEIN-CALORIE MALNUTRITION: ICD-10-CM

## 2023-08-12 DIAGNOSIS — D58.9 HEREDITARY HEMOLYTIC ANEMIA, UNSPECIFIED: ICD-10-CM

## 2023-08-12 DIAGNOSIS — G47.00 INSOMNIA, UNSPECIFIED: ICD-10-CM

## 2023-08-12 DIAGNOSIS — N17.9 ACUTE KIDNEY FAILURE, UNSPECIFIED: ICD-10-CM

## 2023-08-12 DIAGNOSIS — I10 ESSENTIAL (PRIMARY) HYPERTENSION: ICD-10-CM

## 2023-08-12 DIAGNOSIS — R35.1 NOCTURIA: ICD-10-CM

## 2023-08-12 DIAGNOSIS — R16.1 SPLENOMEGALY, NOT ELSEWHERE CLASSIFIED: ICD-10-CM

## 2024-03-19 ENCOUNTER — APPOINTMENT (OUTPATIENT)
Dept: ORTHOPEDIC SURGERY | Facility: CLINIC | Age: 69
End: 2024-03-19
Payer: MEDICARE

## 2024-03-19 VITALS
SYSTOLIC BLOOD PRESSURE: 143 MMHG | WEIGHT: 225 LBS | BODY MASS INDEX: 29.82 KG/M2 | HEIGHT: 73 IN | DIASTOLIC BLOOD PRESSURE: 89 MMHG

## 2024-03-19 DIAGNOSIS — M25.562 PAIN IN LEFT KNEE: ICD-10-CM

## 2024-03-19 PROCEDURE — 73564 X-RAY EXAM KNEE 4 OR MORE: CPT | Mod: LT

## 2024-03-19 PROCEDURE — 99215 OFFICE O/P EST HI 40 MIN: CPT

## 2024-03-19 NOTE — HISTORY OF PRESENT ILLNESS
[de-identified] : DARNELL CHAVEZ is a 69 year old male who presents with left knee pain. His pain is limiting the quality of his life and he cannot ski as often as he desires as a result of the pain. He has had cortisone and gel injections in the knee without lasting relief. He is s/p two arthroscopies in the knee. He has tried NSIADs and physical therapy without adequate pain relief. He is s/p right total hip replacement and he has no complaints regarding his right hip at this time. He presents today for initial evaluation of his left knee.

## 2024-03-19 NOTE — REASON FOR VISIT
[Follow-Up Visit] : a follow-up visit for [Other: ____] : [unfilled] [FreeTextEntry2] : S/P Right robotic_assisted anterior total hip replacement with Efren. DOS 3/12/21.

## 2024-03-19 NOTE — ADDENDUM
[FreeTextEntry1] : This note was authored by William Estes working as a medical scribe for Dr. Shahab Candelaria. The note was reviewed, edited, and revised by Dr. Shahab Candelaria whom is in agreement with the exam findings, imaging findings, and treatment plan. 03/19/2024

## 2024-03-19 NOTE — PHYSICAL EXAM
[de-identified] : The patient appears well nourished and in no apparent distress.  The patient is alert and oriented to person, place, and time.   Affect and mood appear normal. The head is normocephalic and atraumatic.  The eyes reveal normal sclera and extra ocular muscles are intact. The tongue is midline with no apparent lesions.  Skin shows normal turgor with no evidence of eczema or psoriasis.  No respiratory distress noted.  Sensation grossly intact.		  [de-identified] : Exam of the left knee shows a mild valgus deformity, intact MCL, 0 to 124 degrees of flexion measured with a goniometer. There is an effusion.   5/5 motor strength bilaterally distally. Sensation intact distally.		  [de-identified] : X-ray: 4 views of the left knee demonstrate bone on bone valgus arthritis.

## 2024-03-19 NOTE — DISCUSSION/SUMMARY
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 The patient has exhausted a minimum of 3 months conservative treatment including prior injections, physical therapy, over the counter NSIADS and pain medication where indicated. In addition, the patient's quality of life is diminished due to significant chronic pain. The patient is having difficulty ambulating, descending stairs, and rising from a seated position.   Based upon the patient's continued symptoms and failure to respond to conservative treatment, I have recommended a left total knee replacement for this patient. A long discussion took place with the patient describing what a total joint replacement is and what the procedure would entail. A knee model, similar to the implants that will be used during the operation, was utilized to demonstrate the implants. Choices of implant manufactures were discussed and reviewed. The ability to secure the implant utilizing cement or cementless (press fit) fixation was discussed. The patient agrees with the plan of care as well as the use of implants.   The hospitalization and post-operative care and rehabilitation were also discussed. The use of perioperative antibiotics and DVT prophylaxis were discussed. The risk, benefits and alternatives to a surgical intervention were discussed at length with the patient. The patient was also advised of risks related to the medical comorbidities and elevated body mass index (BMI). A lengthy discussion took place to review the most common complications including but not limited to: deep vein thrombosis, pulmonary embolus, heart attack, stroke, infection, wound breakdown, numbness, damage to nerves, tendon, muscles, arteries or other blood vessels, death and other possible complications from anesthesia. The patient was told that we will take steps to minimize these risks by using sterile technique, antibiotics and DVT prophylaxis when appropriate and follow the patient postoperatively in the office setting to monitor progress. The possibility of recurrent pain, no improvement in pain and actual worsening of pain were also discussed with the patient.   The discharge plan of care focused on the patient going home following surgery. The patient was encouraged to make the necessary arrangements to have someone stay with them when they are discharged home. Following discharge, a home care nurse will visit the patient. The home care nurse will open your home care case and request home physical therapy services. Home physical therapy will commence following discharge provided it is appropriate and covered by the health insurance benefit plan.   The benefits of surgery were discussed with the patient including the potential for improving the patient's current clinical condition through operative intervention. Alternatives to surgical intervention including continued conservative management were also discussed in detail. All questions were answered to the satisfaction of the patient. The treatment plan of care, as well as a model of a total knee equivalent to the one that will be used for their total joint replacement, was shared with the patient. The patient participated and agreed to the plan of care as well as the use of the recommended implants for their total joint replacement surgery.   We discussed that the knee replacement will be done with robotic assistance to enhance accuracy and dynamic joint balancing.	  The patient will receive a 1 ML injection of 15MG/ML Toradol the night of surgery and the following morning for inflammation and pain control.

## 2024-04-23 NOTE — DISCUSSION/SUMMARY
[de-identified] : Patient was reevaluated on today's date with regard to underlying lumbar spondylosis. Patient appears to be very active with physical modalities home exercises weight loss etc. Patient completed physical therapy and is now completing home modalities. I do think the reasonable at this point in time for this patient to try a course of injections for pain control in order to maintain a high quality of life patient in followup on approximate 4 months of his signs and symptoms persist we will consider ordering a repeat lumbar MRI 1 Principal Discharge DX:	Left hydrocele

## 2024-08-01 DIAGNOSIS — M17.12 UNILATERAL PRIMARY OSTEOARTHRITIS, LEFT KNEE: ICD-10-CM

## 2024-08-07 NOTE — PATIENT PROFILE ADULT - HAVE YOU RECENTLY LOST WEIGHT WITHOUT TRYING?
Pablo LOPEZ Chelo is here to follow up with Luc Stuart MD after a consult for hemorrhoids on 7/8/24. He reports he is not in as much pain as prior. After a bowel movement there is drainage (bloody drainage) and a small amount of pain. He has not done any sitz baths. He does use the gel that was prescribed at his last appointment- which is helping a little bit. He has been also using preparation H- seeming to help. Pablo also take metamucil which does not seem to help. He has not taken his warfarin since 8/2/24. He believes another softer stool would help him with the hemorrhoids. No changes to medications, allergies or health history.    Yes...

## 2024-08-20 ENCOUNTER — APPOINTMENT (OUTPATIENT)
Dept: ORTHOPEDIC SURGERY | Facility: CLINIC | Age: 69
End: 2024-08-20

## 2024-09-17 ENCOUNTER — APPOINTMENT (OUTPATIENT)
Dept: ORTHOPEDIC SURGERY | Facility: CLINIC | Age: 69
End: 2024-09-17

## 2024-09-23 ENCOUNTER — APPOINTMENT (OUTPATIENT)
Dept: ORTHOPEDIC SURGERY | Facility: HOSPITAL | Age: 69
End: 2024-09-23

## 2024-10-15 ENCOUNTER — APPOINTMENT (OUTPATIENT)
Dept: ORTHOPEDIC SURGERY | Facility: CLINIC | Age: 69
End: 2024-10-15

## 2024-11-06 ENCOUNTER — APPOINTMENT (OUTPATIENT)
Dept: ORTHOPEDIC SURGERY | Facility: CLINIC | Age: 69
End: 2024-11-06

## 2024-12-31 NOTE — PHYSICAL THERAPY INITIAL EVALUATION ADULT - OCCUPATION
Patient with AST 39 ALT is elevated at 57 bilirubin at 1.18 we will follow-up with repeat labs we will check the hepatitis profile also.   Retired